# Patient Record
Sex: FEMALE | Race: WHITE
[De-identification: names, ages, dates, MRNs, and addresses within clinical notes are randomized per-mention and may not be internally consistent; named-entity substitution may affect disease eponyms.]

---

## 2018-01-15 ENCOUNTER — HOSPITAL ENCOUNTER (OUTPATIENT)
Dept: HOSPITAL 17 - HROP | Age: 73
Discharge: HOME HEALTH | End: 2018-01-15
Attending: INTERNAL MEDICINE
Payer: MEDICARE

## 2018-01-15 VITALS
HEART RATE: 78 BPM | RESPIRATION RATE: 16 BRPM | OXYGEN SATURATION: 98 % | TEMPERATURE: 97.8 F | DIASTOLIC BLOOD PRESSURE: 57 MMHG | SYSTOLIC BLOOD PRESSURE: 110 MMHG

## 2018-01-15 VITALS
HEART RATE: 67 BPM | OXYGEN SATURATION: 96 % | DIASTOLIC BLOOD PRESSURE: 54 MMHG | SYSTOLIC BLOOD PRESSURE: 104 MMHG | RESPIRATION RATE: 19 BRPM

## 2018-01-15 VITALS
OXYGEN SATURATION: 98 % | RESPIRATION RATE: 18 BRPM | HEART RATE: 77 BPM | SYSTOLIC BLOOD PRESSURE: 107 MMHG | DIASTOLIC BLOOD PRESSURE: 48 MMHG

## 2018-01-15 VITALS — HEIGHT: 62 IN | WEIGHT: 112.22 LBS | BODY MASS INDEX: 20.65 KG/M2

## 2018-01-15 VITALS
OXYGEN SATURATION: 97 % | SYSTOLIC BLOOD PRESSURE: 112 MMHG | DIASTOLIC BLOOD PRESSURE: 72 MMHG | HEART RATE: 72 BPM | RESPIRATION RATE: 16 BRPM

## 2018-01-15 VITALS
SYSTOLIC BLOOD PRESSURE: 103 MMHG | HEART RATE: 80 BPM | RESPIRATION RATE: 17 BRPM | DIASTOLIC BLOOD PRESSURE: 47 MMHG | OXYGEN SATURATION: 97 %

## 2018-01-15 VITALS
DIASTOLIC BLOOD PRESSURE: 71 MMHG | HEART RATE: 89 BPM | SYSTOLIC BLOOD PRESSURE: 127 MMHG | TEMPERATURE: 97.6 F | OXYGEN SATURATION: 99 % | RESPIRATION RATE: 20 BRPM

## 2018-01-15 DIAGNOSIS — C21.0: Primary | ICD-10-CM

## 2018-01-15 DIAGNOSIS — R07.9: ICD-10-CM

## 2018-01-15 DIAGNOSIS — K22.70: ICD-10-CM

## 2018-01-15 DIAGNOSIS — R63.4: ICD-10-CM

## 2018-01-15 DIAGNOSIS — J44.9: ICD-10-CM

## 2018-01-15 DIAGNOSIS — K21.9: ICD-10-CM

## 2018-01-15 DIAGNOSIS — E78.00: ICD-10-CM

## 2018-01-15 DIAGNOSIS — K44.9: ICD-10-CM

## 2018-01-15 DIAGNOSIS — F17.200: ICD-10-CM

## 2018-01-15 LAB
BASOPHILS # BLD AUTO: 0.1 TH/MM3 (ref 0–0.2)
BASOPHILS NFR BLD: 1 % (ref 0–2)
EOSINOPHIL # BLD: 0.3 TH/MM3 (ref 0–0.4)
EOSINOPHIL NFR BLD: 3.3 % (ref 0–4)
ERYTHROCYTE [DISTWIDTH] IN BLOOD BY AUTOMATED COUNT: 17.9 % (ref 11.6–17.2)
HCT VFR BLD CALC: 37.6 % (ref 35–46)
HGB BLD-MCNC: 12.6 GM/DL (ref 11.6–15.3)
INR PPP: 1 RATIO
LYMPHOCYTES # BLD AUTO: 1.1 TH/MM3 (ref 1–4.8)
LYMPHOCYTES NFR BLD AUTO: 15 % (ref 9–44)
MCH RBC QN AUTO: 27.3 PG (ref 27–34)
MCHC RBC AUTO-ENTMCNC: 33.6 % (ref 32–36)
MCV RBC AUTO: 81.3 FL (ref 80–100)
MONOCYTE #: 0.6 TH/MM3 (ref 0–0.9)
MONOCYTES NFR BLD: 8 % (ref 0–8)
NEUTROPHILS # BLD AUTO: 5.6 TH/MM3 (ref 1.8–7.7)
NEUTROPHILS NFR BLD AUTO: 72.7 % (ref 16–70)
PLATELET # BLD: 340 TH/MM3 (ref 150–450)
PMV BLD AUTO: 7.7 FL (ref 7–11)
PROTHROMBIN TIME: 9.9 SEC (ref 9.8–11.6)
RBC # BLD AUTO: 4.62 MIL/MM3 (ref 4–5.3)
WBC # BLD AUTO: 7.6 TH/MM3 (ref 4–11)

## 2018-01-15 PROCEDURE — 36561 INSERT TUNNELED CV CATH: CPT

## 2018-01-15 PROCEDURE — 76937 US GUIDE VASCULAR ACCESS: CPT

## 2018-01-15 PROCEDURE — 77001 FLUOROGUIDE FOR VEIN DEVICE: CPT

## 2018-01-15 PROCEDURE — C1788 PORT, INDWELLING, IMP: HCPCS

## 2018-01-15 PROCEDURE — 85730 THROMBOPLASTIN TIME PARTIAL: CPT

## 2018-01-15 PROCEDURE — 99152 MOD SED SAME PHYS/QHP 5/>YRS: CPT

## 2018-01-15 PROCEDURE — 99153 MOD SED SAME PHYS/QHP EA: CPT

## 2018-01-15 PROCEDURE — 85610 PROTHROMBIN TIME: CPT

## 2018-01-15 PROCEDURE — 85025 COMPLETE CBC W/AUTO DIFF WBC: CPT

## 2018-01-15 NOTE — PD.RAD
Post Procedure Progress Note


Pre Procedure Diagnosis:  


(1) Anal cancer


Post Procedure Diagnosis:  


(1) Anal cancer


Procedure Date:


Lewis 15, 2018


Supervising Radiologist:


Andres Morel


Estimated blood loss:


2cc


Anesthesia:  Local, Conscious Sedation


Plan of Activity


Patient to Unit:  [facility]


Patient Condition:  Good


Additional Comments:


Port placed via the right IJ


Catheter in good position OK for use.


Full dictated report to follow


See PACS Report for procedural detail/treatment











Andres Morel MD Lewis 15, 2018 10:22

## 2018-01-15 NOTE — RADRPT
EXAM DATE/TIME:  01/15/2018 11:23 

 

[facility] COMPARISON:  

No previous studies available for comparison.

                     

 

INDICATIONS :               

Patient with a history of anal cancer.

                     

 

MEDICAL HISTORY :     

COPD

Barrets esophagus

Hypercholestermia

GERD

Hiatal hernia

Anal Cancer

 

SURGICAL HISTORY :     

Hysterectomy

Colonscopy

 

ENCOUNTER:     

Initial

 

ACUITY:     

2 months

 

PAIN SCORE:     

0/10

                     

 

FLUORO TIME:     

0.33 minutes

 

IMAGE SERIES:      

1

 

SEDATION TIME:       

30 minutes

                     

 

ACCESS:     

Right internal jugular vein 

 

SEDATION:      

1.)  4 mg midazolam (Versed)  IV     

2.)  100 mcg fentanyl (Sublimaze)  IV     

      

Prophylactic antibiotics were administered with appropriate pre-procedure timing.     

Vancomycin within 2 hours of procedure, Ancef (or alternative) within 1 hour of procedure.     

      

 

DEVICE:      

1.  8 Montserratian single lumen   [id number]      

 

 

PROCEDURE :     

1.  Continuous pulse oximetry and EKG monitoring.

2.  Intravenous conscious sedation.

3.  Ultrasound guidance for venous access.

4.  Fluoroscopic guided implantable central venous port placement.

 

The patient was placed supine. The neck was prepped in sterile fashion.  Full sterile technique was u
sed, including cap, mask, sterile gloves and gown, and a large sterile sheet.  Hand hygiene and 2% ch
lorhexidine Betadine was utilized per protocol for cutaneous antisepsis with appropriate dry time for
 site.  Sterile gel and sterile probe cover were utilized for ultrasound guidance.   The skin and sub
cutaneous tissues were infiltrated with local anesthetic solution.  

 

Under direct ultrasound guidance, central venous access was accomplished via the right internal jugul
ar vein.  The ultrasound images depicting access guidance were stored and saved to PACS for permanent
 record.  A subcutaneous pocket was created using blunt dissection.  The port was introduced to the p
ocket.  The catheter tubing was fed through a subcutaneous tunnel to the venotomy site.  The catheter
 tubing was cut to a suitable length and then was introduced through a valved Peel-Away sheath and po
sitioned with catheter tubing tip at the cavo-atrial junction level.  The pocket incision was closed 
with subcuticular Vicryl suture.  Steri-Strips were applied.  The port was flushed and locked with he
cem solution per protocol.  Sterile dressing was applied to the site.  The patient tolerated the pr
ocedure well.

 

Conscious sedation was performed with the prescribed dosages and duration as above in the presence of
 an independent trained radiology nurse to assist in the monitoring of the patient.  EKG and oximetry
 remained stable throughout the procedure. The patient tolerated the procedure well and there were no
 complications. The patient was sent to post anesthesia recovery in stable condition.

 

CONCLUSION:     

Uncomplicated ultrasound and fluoroscopic guided implanted central venous port catheter placement as 
described in detail above.  An 8 Montserratian Power port was placed.

 

 

 

 Andres Morel MD on January 15, 2018 at 10:25           

Board Certified Radiologist.

 This report was verified electronically.

## 2018-04-30 ENCOUNTER — HOSPITAL ENCOUNTER (INPATIENT)
Dept: HOSPITAL 17 - NEPE | Age: 73
LOS: 5 days | Discharge: NURSING, REHAB OR LONG-TERM CARE | DRG: 871 | End: 2018-05-05
Attending: FAMILY MEDICINE | Admitting: FAMILY MEDICINE
Payer: COMMERCIAL

## 2018-04-30 VITALS
SYSTOLIC BLOOD PRESSURE: 131 MMHG | RESPIRATION RATE: 21 BRPM | DIASTOLIC BLOOD PRESSURE: 60 MMHG | OXYGEN SATURATION: 95 % | HEART RATE: 94 BPM

## 2018-04-30 VITALS
HEART RATE: 92 BPM | OXYGEN SATURATION: 97 % | RESPIRATION RATE: 25 BRPM | SYSTOLIC BLOOD PRESSURE: 119 MMHG | DIASTOLIC BLOOD PRESSURE: 57 MMHG

## 2018-04-30 VITALS
SYSTOLIC BLOOD PRESSURE: 116 MMHG | DIASTOLIC BLOOD PRESSURE: 96 MMHG | RESPIRATION RATE: 18 BRPM | HEART RATE: 97 BPM | OXYGEN SATURATION: 97 %

## 2018-04-30 VITALS
HEART RATE: 90 BPM | DIASTOLIC BLOOD PRESSURE: 56 MMHG | RESPIRATION RATE: 14 BRPM | OXYGEN SATURATION: 97 % | SYSTOLIC BLOOD PRESSURE: 141 MMHG

## 2018-04-30 VITALS — BODY MASS INDEX: 18.26 KG/M2 | WEIGHT: 99.21 LBS | HEIGHT: 62 IN

## 2018-04-30 VITALS
TEMPERATURE: 97.7 F | RESPIRATION RATE: 20 BRPM | SYSTOLIC BLOOD PRESSURE: 135 MMHG | OXYGEN SATURATION: 97 % | DIASTOLIC BLOOD PRESSURE: 68 MMHG | HEART RATE: 101 BPM

## 2018-04-30 VITALS
HEART RATE: 97 BPM | OXYGEN SATURATION: 98 % | DIASTOLIC BLOOD PRESSURE: 55 MMHG | RESPIRATION RATE: 23 BRPM | SYSTOLIC BLOOD PRESSURE: 113 MMHG

## 2018-04-30 VITALS — OXYGEN SATURATION: 94 %

## 2018-04-30 VITALS
HEART RATE: 94 BPM | RESPIRATION RATE: 18 BRPM | OXYGEN SATURATION: 97 % | TEMPERATURE: 97.6 F | DIASTOLIC BLOOD PRESSURE: 58 MMHG | SYSTOLIC BLOOD PRESSURE: 133 MMHG

## 2018-04-30 DIAGNOSIS — F41.9: ICD-10-CM

## 2018-04-30 DIAGNOSIS — K52.1: ICD-10-CM

## 2018-04-30 DIAGNOSIS — I10: ICD-10-CM

## 2018-04-30 DIAGNOSIS — J69.0: ICD-10-CM

## 2018-04-30 DIAGNOSIS — A41.9: Primary | ICD-10-CM

## 2018-04-30 DIAGNOSIS — E78.00: ICD-10-CM

## 2018-04-30 DIAGNOSIS — K44.9: ICD-10-CM

## 2018-04-30 DIAGNOSIS — J44.9: ICD-10-CM

## 2018-04-30 DIAGNOSIS — Z91.040: ICD-10-CM

## 2018-04-30 DIAGNOSIS — E86.0: ICD-10-CM

## 2018-04-30 DIAGNOSIS — K21.0: ICD-10-CM

## 2018-04-30 DIAGNOSIS — T45.1X5A: ICD-10-CM

## 2018-04-30 DIAGNOSIS — E83.39: ICD-10-CM

## 2018-04-30 DIAGNOSIS — E87.6: ICD-10-CM

## 2018-04-30 DIAGNOSIS — C21.8: ICD-10-CM

## 2018-04-30 DIAGNOSIS — N39.0: ICD-10-CM

## 2018-04-30 DIAGNOSIS — E87.2: ICD-10-CM

## 2018-04-30 DIAGNOSIS — R15.9: ICD-10-CM

## 2018-04-30 DIAGNOSIS — K22.70: ICD-10-CM

## 2018-04-30 DIAGNOSIS — F17.210: ICD-10-CM

## 2018-04-30 DIAGNOSIS — E16.2: ICD-10-CM

## 2018-04-30 DIAGNOSIS — K59.00: ICD-10-CM

## 2018-04-30 LAB
ALBUMIN SERPL-MCNC: 2.5 GM/DL (ref 3.4–5)
ALP SERPL-CCNC: 225 U/L (ref 45–117)
ALT SERPL-CCNC: 18 U/L (ref 10–53)
AST SERPL-CCNC: 25 U/L (ref 15–37)
BACTERIA #/AREA URNS HPF: (no result) /HPF
BASOPHILS # BLD AUTO: 0 TH/MM3 (ref 0–0.2)
BASOPHILS NFR BLD: 0.1 % (ref 0–2)
BILIRUB SERPL-MCNC: 0.8 MG/DL (ref 0.2–1)
BUN SERPL-MCNC: 11 MG/DL (ref 7–18)
CALCIUM SERPL-MCNC: 9 MG/DL (ref 8.5–10.1)
CHLORIDE SERPL-SCNC: 99 MEQ/L (ref 98–107)
COLOR UR: YELLOW
CREAT SERPL-MCNC: 0.65 MG/DL (ref 0.5–1)
EOSINOPHIL # BLD: 0 TH/MM3 (ref 0–0.4)
EOSINOPHIL NFR BLD: 0.2 % (ref 0–4)
ERYTHROCYTE [DISTWIDTH] IN BLOOD BY AUTOMATED COUNT: 33.4 % (ref 11.6–17.2)
GFR SERPLBLD BASED ON 1.73 SQ M-ARVRAT: 90 ML/MIN (ref 89–?)
GLUCOSE SERPL-MCNC: 79 MG/DL (ref 74–106)
GLUCOSE UR STRIP-MCNC: (no result) MG/DL
HCO3 BLD-SCNC: 26.3 MEQ/L (ref 21–32)
HCT VFR BLD CALC: 30 % (ref 35–46)
HGB BLD-MCNC: 10.1 GM/DL (ref 11.6–15.3)
HGB UR QL STRIP: (no result)
INR PPP: 0.9 RATIO
KETONES UR STRIP-MCNC: 40 MG/DL
LYMPHOCYTES # BLD AUTO: 0.3 TH/MM3 (ref 1–4.8)
LYMPHOCYTES NFR BLD AUTO: 5.9 % (ref 9–44)
LYMPHOCYTES: 5 % (ref 9–44)
MCH RBC QN AUTO: 30.4 PG (ref 27–34)
MCHC RBC AUTO-ENTMCNC: 33.5 % (ref 32–36)
MCV RBC AUTO: 90.5 FL (ref 80–100)
METAMYELOCYTES NFR BLD: 1 % (ref 0–1)
MONOCYTE #: 0.5 TH/MM3 (ref 0–0.9)
MONOCYTES NFR BLD: 9 % (ref 0–8)
MONOCYTES: 5 % (ref 0–8)
MUCOUS THREADS #/AREA URNS LPF: (no result) /LPF
MYELOCYTES NFR BLD: 2 % (ref 0–0)
NEUTROPHILS # BLD AUTO: 4.5 TH/MM3 (ref 1.8–7.7)
NEUTROPHILS NFR BLD AUTO: 84.8 % (ref 16–70)
NEUTS BAND # BLD MANUAL: 4.8 TH/MM3 (ref 1.8–7.7)
NEUTS BAND NFR BLD: 6 % (ref 0–6)
NEUTS SEG NFR BLD MANUAL: 81 % (ref 16–70)
NITRITE UR QL STRIP: (no result)
NUCLEATED RED BLOOD CELL: 1 (ref 0–0)
PLATELET # BLD: 272 TH/MM3 (ref 150–450)
PMV BLD AUTO: 7.8 FL (ref 7–11)
PROT SERPL-MCNC: 7.2 GM/DL (ref 6.4–8.2)
PROTHROMBIN TIME: 9.5 SEC (ref 9.8–11.6)
RBC # BLD AUTO: 3.32 MIL/MM3 (ref 4–5.3)
SODIUM SERPL-SCNC: 138 MEQ/L (ref 136–145)
SP GR UR STRIP: 1.03 (ref 1–1.03)
SQUAMOUS #/AREA URNS HPF: 3 /HPF (ref 0–5)
URINE LEUKOCYTE ESTERASE: (no result)
WBC # BLD AUTO: 5.3 TH/MM3 (ref 4–11)
WBC NRBC COR # BLD: 1 /100 WBC (ref 0–0)

## 2018-04-30 PROCEDURE — 36600 WITHDRAWAL OF ARTERIAL BLOOD: CPT

## 2018-04-30 PROCEDURE — 83690 ASSAY OF LIPASE: CPT

## 2018-04-30 PROCEDURE — 87086 URINE CULTURE/COLONY COUNT: CPT

## 2018-04-30 PROCEDURE — 87040 BLOOD CULTURE FOR BACTERIA: CPT

## 2018-04-30 PROCEDURE — 80048 BASIC METABOLIC PNL TOTAL CA: CPT

## 2018-04-30 PROCEDURE — 85730 THROMBOPLASTIN TIME PARTIAL: CPT

## 2018-04-30 PROCEDURE — 82947 ASSAY GLUCOSE BLOOD QUANT: CPT

## 2018-04-30 PROCEDURE — 87493 C DIFF AMPLIFIED PROBE: CPT

## 2018-04-30 PROCEDURE — 94640 AIRWAY INHALATION TREATMENT: CPT

## 2018-04-30 PROCEDURE — 96365 THER/PROPH/DIAG IV INF INIT: CPT

## 2018-04-30 PROCEDURE — 85025 COMPLETE CBC W/AUTO DIFF WBC: CPT

## 2018-04-30 PROCEDURE — 83735 ASSAY OF MAGNESIUM: CPT

## 2018-04-30 PROCEDURE — 82805 BLOOD GASES W/O2 SATURATION: CPT

## 2018-04-30 PROCEDURE — 71045 X-RAY EXAM CHEST 1 VIEW: CPT

## 2018-04-30 PROCEDURE — 82948 REAGENT STRIP/BLOOD GLUCOSE: CPT

## 2018-04-30 PROCEDURE — 80069 RENAL FUNCTION PANEL: CPT

## 2018-04-30 PROCEDURE — 85027 COMPLETE CBC AUTOMATED: CPT

## 2018-04-30 PROCEDURE — 82533 TOTAL CORTISOL: CPT

## 2018-04-30 PROCEDURE — 74230 X-RAY XM SWLNG FUNCJ C+: CPT

## 2018-04-30 PROCEDURE — 94664 DEMO&/EVAL PT USE INHALER: CPT

## 2018-04-30 PROCEDURE — 85018 HEMOGLOBIN: CPT

## 2018-04-30 PROCEDURE — 74177 CT ABD & PELVIS W/CONTRAST: CPT

## 2018-04-30 PROCEDURE — 96361 HYDRATE IV INFUSION ADD-ON: CPT

## 2018-04-30 PROCEDURE — 80053 COMPREHEN METABOLIC PANEL: CPT

## 2018-04-30 PROCEDURE — 87506 IADNA-DNA/RNA PROBE TQ 6-11: CPT

## 2018-04-30 PROCEDURE — 81001 URINALYSIS AUTO W/SCOPE: CPT

## 2018-04-30 PROCEDURE — 85610 PROTHROMBIN TIME: CPT

## 2018-04-30 PROCEDURE — 83605 ASSAY OF LACTIC ACID: CPT

## 2018-04-30 PROCEDURE — 85007 BL SMEAR W/DIFF WBC COUNT: CPT

## 2018-04-30 RX ADMIN — SODIUM CHLORIDE SCH MLS/HR: 900 INJECTION, SOLUTION INTRAVENOUS at 22:09

## 2018-04-30 RX ADMIN — ENOXAPARIN SODIUM SCH MG: 40 INJECTION SUBCUTANEOUS at 20:29

## 2018-04-30 RX ADMIN — BUDESONIDE AND FORMOTEROL FUMARATE DIHYDRATE SCH PUFF: 160; 4.5 AEROSOL RESPIRATORY (INHALATION) at 20:28

## 2018-04-30 RX ADMIN — IPRATROPIUM BROMIDE AND ALBUTEROL SULFATE SCH AMPULE: .5; 3 SOLUTION RESPIRATORY (INHALATION) at 22:21

## 2018-04-30 RX ADMIN — MONTELUKAST SODIUM SCH MG: 10 TABLET, COATED ORAL at 20:28

## 2018-04-30 RX ADMIN — Medication SCH ML: at 20:29

## 2018-04-30 RX ADMIN — PHENYTOIN SODIUM SCH MLS/HR: 50 INJECTION INTRAMUSCULAR; INTRAVENOUS at 20:27

## 2018-04-30 NOTE — RADRPT
EXAM DATE/TIME:  04/30/2018 17:42 

 

[facility] COMPARISON:     

No previous studies available for comparison.

 

                     

INDICATIONS :     

Cough

                     

 

MEDICAL HISTORY :            

Cardiovascular disease. Chronic obstructive pulmonary disease. Carcinoma, rectal.Hypertension   

 

SURGICAL HISTORY :        

Appendectomy. Cholecystectomy, infusaport

 

ENCOUNTER:     

Initial                                        

 

ACUITY:     

2 weeks      

 

PAIN SCORE:     

0/10

 

LOCATION:      

chest 

 

FINDINGS:     

Mild patient rotation towards the right.  The left lung is clear.  The heart is normal in size.  Righ
t central line catheter tip projects at the cavoatrial junction.  There are multiple cardiac leads pr
ojected over the right lower chest; behind these leads, there is a lucency which could represent a pl
eural reflection near the lateral costophrenic angle.  There are also two linear lucencies projected 
over the upper right chest which could represent skin folds or represent possible pneumothorax.

 

CONCLUSION:     

Equivocal findings in the right chest suggesting possibility of pneumothorax.  Recommend repeat film 
in expiration and with moving the multiple cardiac leads which project over the lower right chest.

 

 

 

 Rayo Dominguez MD on April 30, 2018 at 18:03           

Board Certified Radiologist.

 This report was verified electronically.

## 2018-04-30 NOTE — PD
HPI


Chief Complaint:  GI Complaint


Time Seen by Provider:  15:22


Travel History


International Travel<30 days:  No


Contact w/Intl Traveler<30days:  No


Traveled to known affect area:  No





History of Present Illness


HPI


72-year-old female that presents to the ED for evaluation of constipation for 

about a week, dehydration as well as not taking care of herself.  Patient has a 

significant history of rectal cancer.  Patient lives alone and apparently has 

not been taking care of herself.  Denies any urinary issues.  Patient does 

state that she is been having no bowel movements for the past week.  Per 

patient she is at chemoradiation and has stopped this is the beginning of the 

month.  Per patient she does not know what the next step is and she follows 

closely with her oncologist and her radiologist oncologist.  She has not 

mentioned this to them.  Patient denies any bleeding but states that she has 

had some "discharge" from the rectum and uses depends.  She cannot really tell 

me what it looks like.  No fevers chills or sweats.  No falls.  She states 

having some lower abdominal pain.  She does have also history of COPD and uses 

inhalers at home.  Patient's pain is 7 out of 10 on the lower abdomen.  Per 

friend who is at bedside patient unfortunately has no family lives alone and 

she is highly concerned about the patient being by herself and not been able to 

take care of self.  She apparently is neglecting herself and is not taking care 

of herself and she wanted us to see if we could have case management involved 

for possible placement or home health.





PFSH


Past Medical History


Asthma:  No


Blood Disorders:  No


Anxiety:  Yes


Depression:  No


Heart Rhythm Problems:  No


Cancer:  Yes (RECTAL/ANAL)


Cardiac Catheterization:  No


Cardiovascular Problems:  Yes (CATH 1996)


High Cholesterol:  Yes


Chest Pain:  Yes


Congestive Heart Failure:  No


COPD:  Yes


Diabetes:  No


Diminished Hearing:  No


Endocrine:  No


Gastrointestinal Disorders:  Yes (GERD, DAWSON'S ESOPHAGUS)


GERD:  Yes


Genitourinary:  No


Hypertension:  Yes


Immune Disorder:  No


Implanted Vascular Access Dvce:  Yes (RIGHT CHEST)


Musculoskeletal:  Yes (USES CANE FOR BALANCE)


Neurologic:  No


Psychiatric:  No


Reproductive:  No


Respiratory:  Yes (CHRONIC BRONCHITIS, EMPHASEMA)


Immunizations Current:  Yes


Myocardial Infarction:  No


Sleep Apnea:  No


PNEUMOCCOCAL Vaccine (Year):  1


Menopausal:  Yes





Past Surgical History


Abdominal Surgery:  Yes (2 BOWEL RESECTIONS DUE TO MASSES)


Appendectomy:  Yes


Cholecystectomy:  Yes


Coronary Artery Bypass Graft:  No


Hysterectomy:  Yes


Other Surgery:  Yes (PORT PLACED- RIGHT CHEST)





Social History


Alcohol Use:  No


Tobacco Use:  Yes (5 CIGARETTES PER DAY)


Substance Use:  No





Allergies-Medications


(Allergen,Severity, Reaction):  


Coded Allergies:  


     latex (Verified  Allergy, Severe, Rash, 4/30/18)


Reported Meds & Prescriptions





Reported Meds & Active Scripts


Active


Reported


Symbicort Inh (Budesonide/Formoterol Fumarate) 160-4.5 Mcg/Act Aero 2 Puff INH 

Q12HR


Ventolin Hfa 18 GM Inh (Albuterol Sulfate) 90 Mcg/Act Aer 2 Puff INH Q6H PRN


Duoneb (Ipratropium-Albuterol Neb) 0.5-2.5 Mg/3 Ml Neb 1 Nebule INH Q6HR NEB


Dicyclomine (Dicyclomine HCl) 20 Mg Tab 20 Mg PO QID


Amitiza (Lubiprostone) 8 Mcg Cap 24 Mcg PO BID


Sucralfate 1 Gram Tab 1 Gm PO TID


     on empty stomach


Lorazepam 0.5 Mg Tab 0.5 Mg PO BID PRN


Zantac (Ranitidine HCl) 300 Mg Tab 300 Mg PO DAILY


Pantoprazole (Pantoprazole Sodium) 40 Mg Tab 40 Mg PO DAILY


Montelukast (Montelukast Sodium) 10 Mg Tab 10 Mg PO HS


Simvastatin 20 Mg Tab 20 Mg PO DAILY


Proair Hfa (Albuterol Sulfate) 90 Mcg Hfa.aer.ad 2.5 Mg INH Q6HR








Review of Systems


Except as stated in HPI:  all other systems reviewed are Neg





Physical Exam


Narrative


GENERAL: 


SKIN: Warm and dry.


HEAD: Atraumatic. Normocephalic. 


EYES: Pupils equal and round. No scleral icterus. No injection or drainage. 


ENT: No nasal bleeding or discharge.  Mucous membranes pink and moist.  Tongue 

is midline.  No uvula deviation.


NECK: Trachea midline. No JVD. 


CARDIOVASCULAR: Regular rate and rhythm.  No murmurs, S3, S4.


RESPIRATORY: No accessory muscle use. Clear to auscultation. Breath sounds 

equal bilaterally. 


GASTROINTESTINAL: Abdomen soft, patient has a producible pain on the left lower 

quadrant, nondistended. Hepatic and splenic margins not palpable.  Rectal exam 

revealed what appears to be a mass at the 6:00 area of the rectum.  Patient 

does appear to have some liquidy stool what appears to be diarrhea on the 

dependent as well as on exam.  No obvious hard stool noted.  No obvious sign of 

infection or deformity.  Patient does appear to have some old hemorrhoids as 

well.


MUSCULOSKELETAL: Extremities without clubbing, cyanosis, or edema. No obvious 

deformities.  Full range of motion of the upper and lower extremities 

bilaterally.  2+ pulses bilaterally.


NEUROLOGICAL: Awake and alert. No obvious cranial nerve deficits.  Motor 

grossly within normal limits. Five out of 5 muscle strength in the arms and 

legs.  Normal speech.


PSYCHIATRIC: Appropriate mood and affect; insight and judgment normal.





Data


Data


Last Documented VS





Vital Signs








  Date Time  Temp Pulse Resp B/P (MAP) Pulse Ox O2 Delivery O2 Flow Rate FiO2


 


4/30/18 19:14  97 18 116/96 (103) 97 Room Air  


 


4/30/18 12:58 97.7       








Orders





 Orders


Complete Blood Count With Diff (4/30/18 13:01)


Comprehensive Metabolic Panel (4/30/18 13:01)


Lipase (4/30/18 13:01)


Prothrombin Time / Inr (Pt) (4/30/18 13:01)


Act Partial Throm Time (Ptt) (4/30/18 13:01)


Urinalysis - C+S If Indicated (4/30/18 13:01)


Ct Abd/Pel W Iv Contrast(Rout) (4/30/18 15:44)


Fleets Enema (Adult) (Fleets Enema (Adul (4/30/18 15:45)


Urine Culture (4/30/18 14:46)


Sodium Chlorid 0.9% 500 Ml Inj (Ns 500 M (4/30/18 16:00)


Iohexol 350 Inj (Omnipaque 350 Inj) (4/30/18 16:56)


Chest, Single Ap (4/30/18 )


Ceftriaxone Inj (Rocephin Inj) (4/30/18 17:45)


Azithromycin Inj (Zithromax Inj) (4/30/18 17:45)


Blood Culture (4/30/18 18:06)


Chest, Single Ap (4/30/18 )


Metronidazole 500 Mg Inj (Flagyl 500 Mg (4/30/18 19:10)


Admit Order (Ed Use Only) (4/30/18 19:18)





Labs





Laboratory Tests








Test


  4/30/18


14:46 4/30/18


15:03


 


Urine Color YELLOW  


 


Urine Turbidity HAZY  


 


Urine pH 6.0  


 


Urine Specific Gravity 1.028  


 


Urine Protein 30 mg/dL  


 


Urine Glucose (UA) NEG mg/dL  


 


Urine Ketones 40 mg/dL  


 


Urine Occult Blood NEG  


 


Urine Nitrite NEG  


 


Urine Bilirubin NEG  


 


Urine Urobilinogen 8.0 MG/DL  


 


Urine Leukocyte Esterase TRACE  


 


Urine RBC 1 /hpf  


 


Urine WBC 9 /hpf  


 


Urine Squamous Epithelial


Cells 3 /hpf 


  


 


 


Urine Bacteria OCC /hpf  


 


Urine Mucus MANY /lpf  


 


Microscopic Urinalysis Comment


  CULTURE


INDICATED 


 


 


White Blood Count  5.3 TH/MM3 


 


Red Blood Count  3.32 MIL/MM3 


 


Hemoglobin  10.1 GM/DL 


 


Hematocrit  30.0 % 


 


Mean Corpuscular Volume  90.5 FL 


 


Mean Corpuscular Hemoglobin  30.4 PG 


 


Mean Corpuscular Hemoglobin


Concent 


  33.5 % 


 


 


Red Cell Distribution Width  33.4 % 


 


Platelet Count  272 TH/MM3 


 


Mean Platelet Volume  7.8 FL 


 


Neutrophils (%) (Auto)  84.8 % 


 


Lymphocytes (%) (Auto)  5.9 % 


 


Monocytes (%) (Auto)  9.0 % 


 


Eosinophils (%) (Auto)  0.2 % 


 


Basophils (%) (Auto)  0.1 % 


 


Neutrophils # (Auto)  4.5 TH/MM3 


 


Lymphocytes # (Auto)  0.3 TH/MM3 


 


Monocytes # (Auto)  0.5 TH/MM3 


 


Eosinophils # (Auto)  0.0 TH/MM3 


 


Basophils # (Auto)  0.0 TH/MM3 


 


CBC Comment  AUTO DIFF 


 


Differential Total Cells


Counted 


  100 


 


 


Neutrophils % (Manual)  81 % 


 


Band Neutrophils %  6 % 


 


Lymphocytes %  5 % 


 


Monocytes %  5 % 


 


Neutrophils # (Manual)  4.8 TH/MM3 


 


Metamyelocytes  1 % 


 


Myelocytes  2 % 


 


Nucleated Red Blood Cells  1 /100 WBC 


 


Differential Comment


  


  FINAL DIFF


MANUAL


 


Platelet Estimate  NORMAL 


 


Platelet Morphology Comment  NORMAL 


 


Prothrombin Time  9.5 SEC 


 


Prothromb Time International


Ratio 


  0.9 RATIO 


 


 


Activated Partial


Thromboplast Time 


  23.6 SEC 


 


 


Blood Urea Nitrogen  11 MG/DL 


 


Creatinine  0.65 MG/DL 


 


Random Glucose  79 MG/DL 


 


Total Protein  7.2 GM/DL 


 


Albumin  2.5 GM/DL 


 


Calcium Level  9.0 MG/DL 


 


Alkaline Phosphatase  225 U/L 


 


Aspartate Amino Transf


(AST/SGOT) 


  25 U/L 


 


 


Alanine Aminotransferase


(ALT/SGPT) 


  18 U/L 


 


 


Total Bilirubin  0.8 MG/DL 


 


Sodium Level  138 MEQ/L 


 


Potassium Level  3.1 MEQ/L 


 


Chloride Level  99 MEQ/L 


 


Carbon Dioxide Level  26.3 MEQ/L 


 


Anion Gap  13 MEQ/L 


 


Estimat Glomerular Filtration


Rate 


  90 ML/MIN 


 


 


Lipase  35 U/L 











MDM


Medical Decision Making


Medical Screen Exam Complete:  Yes


Emergency Medical Condition:  Yes


Medical Record Reviewed:  Yes


Interpretation(s)


CBC & BMP Diagram


4/30/18 15:03








Total Protein 7.2, Albumin 2.5 L, Calcium Level 9.0, Alkaline Phosphatase 225 H

, Aspartate Amino Transf (AST/SGOT) 25, Alanine Aminotransferase (ALT/SGPT) 18, 

Total Bilirubin 0.8








Last Impressions








Abdomen/Pelvis CT 4/30/18 1544 Signed





Impressions: 





 Service Date/Time:  Monday, April 30, 2018 16:40 - CONCLUSION:  1. Perirectal 





 posttreatment change without evidence for abscess or perforation. 2. Right 

lung 





 base and inferior right middle lobe interstitial and air space consolidation 





 concerning for pneumonia or aspiration. This is a new finding since 1/24/2018 





 exam. 3. Additional ancillary findings, as above.     Alvaro Gage MD 


 


Chest X-Ray 4/30/18 0000 Signed





Impressions: 





 Service Date/Time:  Monday, April 30, 2018 18:19 - CONCLUSION:  1. Right-sided 





 basilar airspace disease. Differential diagnosis includes pneumonia and 





 aspiration.     Tom Hinson MD 


 


Chest X-Ray 4/30/18 0000 Signed





Impressions: 





 Service Date/Time:  Monday, April 30, 2018 17:42 - CONCLUSION:  Equivocal 





 findings in the right chest suggesting possibility of pneumothorax.  Recommend 





 repeat film in expiration and with moving the multiple cardiac leads which 





 project over the lower right chest.     Rayo Dominguez MD 





UA shows possible UTI


Differential Diagnosis


Constipation versus rectal cancer versus anal cancer versus abdominal pain 

versus diverticulitis versus sepsis versus dehydration versus inability to take 

care of self versus weakness


Narrative Course


72-year-old female that presents to the ED for evaluation of constipation and 

inability to take care of self.  Patient was properly examined and was found to 

have signs and symptoms consistent with appears to be possible obstruction.  

Patient has a significant illness including rectal cancer.  Labs and imaging 

order.  Patient was given IV fluids here.  Labs and imaging showed what appears 

to be pneumonia versus aspiration pneumonia.  Otherwise unremarkable exam.  

Patient does have rectal cancer.  I believe that the patient is likely more 

having leakage from stool.  Does not appear to have an abscess on exam.  The 

recommend admission for further evaluation of the pneumonia.  Patient will 

likely need placement as well.  Case discussed with my attending Dr. Boyce 

who agrees to admission.  Patient was started on antibiotics IV.  Patient 

agrees with admission.  Case discussed with Dr. Vila who agrees to admission.





Diagnosis





 Primary Impression:  


 Pneumonia


 Qualified Codes:  J69.0 - Pneumonitis due to inhalation of food and vomit


 Additional Impressions:  


 Anal cancer


 Constipation, chronic





Admitting Information


Admitting Physician Requests:  Admit











Graeme Lawrence Apr 30, 2018 16:49

## 2018-04-30 NOTE — HHI.HP
__________________________________________________





HPI


Service


[facility]


Primary Care Physician


Eloina Sanchez MD


Admission Diagnosis





acute pneumonia vs aspiration pneumonia, UTI, weakness


Diagnoses:  


Travel History


International Travel<30 Days:  No


Contact w/Intl Traveler <30 Da:  No


Traveled to Known Affected Are:  No


History of Present Illness


72 year old female with a PMH significant for rectal ca, GERD/Kerr's 

esophagus and COPD presents to the ED for the evaluation of constipation.  The 

patient reports she has not had a solid bowel movement for the past week.  She 

endorses a constant anal leakage of fecal material.  Her friend who is 

accompanying her reports that the patient has had increasing weakness and 

anorexia for the past week.  She completed radiation on 18 and her last 

chemotherapy was 3/26/18.  Her oncologist is Dr. England.  The patient endorses a 

subjective fever with a productive cough.  She denies any shortness of breath 

or chest pain.  No nausea/vomiting/diarrhea.  No abdominal pain.  No 

lateralizing signs/symptoms.





Review of Systems


Except as stated in HPI:  all other systems reviewed are Neg





Past Family Social History


Past Medical History


Rectal cancer


Kerr's esophagus


GERD


COPD


Past Surgical History


Small bowel resection for unspecified mass


Large bowel resection for unspecified mass


Cholecystectomy


Appendectomy


Hysterectomy


Reported Medications





Reported Meds & Active Scripts


Active


Reported


Symbicort Inh (Budesonide/Formoterol Fumarate) 160-4.5 Mcg/Act Aero 2 Puff INH 

Q12HR


Ventolin Hfa 18 GM Inh (Albuterol Sulfate) 90 Mcg/Act Aer 2 Puff INH Q6H PRN


Duoneb (Ipratropium-Albuterol Neb) 0.5-2.5 Mg/3 Ml Neb 1 Nebule INH Q6HR NEB


Dicyclomine (Dicyclomine HCl) 20 Mg Tab 20 Mg PO QID


Amitiza (Lubiprostone) 8 Mcg Cap 24 Mcg PO BID


Sucralfate 1 Gram Tab 1 Gm PO TID


     on empty stomach


Lorazepam 0.5 Mg Tab 0.5 Mg PO BID PRN


Zantac (Ranitidine HCl) 300 Mg Tab 300 Mg PO DAILY


Pantoprazole (Pantoprazole Sodium) 40 Mg Tab 40 Mg PO DAILY


Montelukast (Montelukast Sodium) 10 Mg Tab 10 Mg PO HS


Simvastatin 20 Mg Tab 20 Mg PO DAILY


Proair Hfa (Albuterol Sulfate) 90 Mcg Hfa.aer.ad 2.5 Mg INH Q6HR


Allergies:  


Coded Allergies:  


     latex (Verified  Allergy, Severe, Rash, 18)


Family History


Negative for CAD/DM


Social History


Smokes approximately 5 cigarettes per day.  Denies alcohol, illicit drugs.





Physical Exam


Vital Signs





Vital Signs








  Date Time  Temp Pulse Resp B/P (MAP) Pulse Ox O2 Delivery O2 Flow Rate FiO2


 


18 20:00        


 


18 19:14  97 18 116/96 (103) 97 Room Air  


 


18 18:00  94 21 131/60 (83) 95 Room Air  


 


18 17:41  97 23 113/55 (74) 98 Room Air  


 


18 16:00  92 25 119/57 (77) 97 Room Air  


 


18 15:46  90 14 141/56 (84) 97 Room Air  


 


18 12:58 97.7 101 20 135/68 (90) 97   








Physical Exam


GENERAL: Thin,  female lying in bed


SKIN: No rashes, ecchymoses or lesions. Cool and dry.


HEAD: Atraumatic. Normocephalic. No temporal or scalp tenderness.


EYES: Pupils equal round and reactive. Extraocular motions intact. No scleral 

icterus. No injection or drainage. 


ENT: Nose without bleeding, purulent drainage or septal hematoma. Throat 

without erythema, tonsillar hypertrophy or exudate. Uvula midline. Airway 

patent.


NECK: Trachea midline. No JVD or lymphadenopathy. Supple, nontender, no 

meningeal signs.


CARDIOVASCULAR: Regular rate and rhythm without murmurs, gallops, or rubs. 


RESPIRATORY: Crackles in right lung base.


GASTROINTESTINAL: Abdomen soft, non-tender, nondistended. No hepato-splenomegaly

, or palpable masses. No guarding.


MUSCULOSKELETAL: Extremities without clubbing, cyanosis, or edema. No joint 

tenderness, effusion, or edema noted. No calf tenderness. 


NEUROLOGICAL: Awake and alert. Cranial nerves II through XII intact.  Motor and 

sensory grossly within normal limits. Normal speech.


Laboratory





Laboratory Tests








Test


  18


14:46 18


15:03


 


Urine Color YELLOW  


 


Urine Turbidity HAZY  


 


Urine pH 6.0  


 


Urine Specific Gravity 1.028  


 


Urine Protein 30  


 


Urine Glucose (UA) NEG  


 


Urine Ketones 40  


 


Urine Occult Blood NEG  


 


Urine Nitrite NEG  


 


Urine Bilirubin NEG  


 


Urine Urobilinogen 8.0  


 


Urine Leukocyte Esterase TRACE  


 


Urine RBC 1  


 


Urine WBC 9  


 


Urine Squamous Epithelial


Cells 3 


  


 


 


Urine Bacteria OCC  


 


Urine Mucus MANY  


 


Microscopic Urinalysis Comment


  CULTURE


INDICATED 


 


 


White Blood Count  5.3 


 


Red Blood Count  3.32 


 


Hemoglobin  10.1 


 


Hematocrit  30.0 


 


Mean Corpuscular Volume  90.5 


 


Mean Corpuscular Hemoglobin  30.4 


 


Mean Corpuscular Hemoglobin


Concent 


  33.5 


 


 


Red Cell Distribution Width  33.4 


 


Platelet Count  272 


 


Mean Platelet Volume  7.8 


 


Neutrophils (%) (Auto)  84.8 


 


Lymphocytes (%) (Auto)  5.9 


 


Monocytes (%) (Auto)  9.0 


 


Eosinophils (%) (Auto)  0.2 


 


Basophils (%) (Auto)  0.1 


 


Neutrophils # (Auto)  4.5 


 


Lymphocytes # (Auto)  0.3 


 


Monocytes # (Auto)  0.5 


 


Eosinophils # (Auto)  0.0 


 


Basophils # (Auto)  0.0 


 


CBC Comment  AUTO DIFF 


 


Differential Total Cells


Counted 


  100 


 


 


Neutrophils % (Manual)  81 


 


Band Neutrophils %  6 


 


Lymphocytes %  5 


 


Monocytes %  5 


 


Neutrophils # (Manual)  4.8 


 


Metamyelocytes  1 


 


Myelocytes  2 


 


Nucleated Red Blood Cells  1 


 


Differential Comment


  


  FINAL DIFF


MANUAL


 


Platelet Estimate  NORMAL 


 


Platelet Morphology Comment  NORMAL 


 


Prothrombin Time  9.5 


 


Prothromb Time International


Ratio 


  0.9 


 


 


Activated Partial


Thromboplast Time 


  23.6 


 


 


Blood Urea Nitrogen  11 


 


Creatinine  0.65 


 


Random Glucose  79 


 


Total Protein  7.2 


 


Albumin  2.5 


 


Calcium Level  9.0 


 


Alkaline Phosphatase  225 


 


Aspartate Amino Transf


(AST/SGOT) 


  25 


 


 


Alanine Aminotransferase


(ALT/SGPT) 


  18 


 


 


Total Bilirubin  0.8 


 


Sodium Level  138 


 


Potassium Level  3.1 


 


Chloride Level  99 


 


Carbon Dioxide Level  26.3 


 


Anion Gap  13 


 


Estimat Glomerular Filtration


Rate 


  90 


 


 


Lipase  35 














 Date/Time


Source Procedure


Growth Status


 


 


 18 18:30


Blood Peripheral Aerobic Blood Culture


Pending Received


 


 18 18:30


Blood Peripheral Anaerobic Blood Culture


Pending Received


 


 18 14:46


Urine Clean Catch Urine Culture


Pending Received








Result Diagram:  


18 1503                                                                   

             18 1503








Caprini VTE Risk Assessment


Caprini VTE Risk Assessment:  Mod/High Risk (score >= 2)


Caprini Risk Assessment Model











 Point Value = 1          Point Value = 2  Point Value = 3  Point Value = 5


 


Age 41-60


Minor surgery


BMI > 25 kg/m2


Swollen legs


Varicose veins


Pregnancy or postpartum


History of unexplained or recurrent


   spontaneous 


Oral contraceptives or hormone


   replacement


Sepsis (< 1 month)


Serious lung disease, including


   pneumonia (< 1 month)


Abnormal pulmonary function


Acute myocardial infarction


Congestive heart failure (< 1 month)


History of inflammatory bowel disease


Medical patient at bed rest Age 61-74


Arthroscopic surgery


Major open surgery (> 45 min)


Laparoscopic surgery (> 45 min)


Malignancy


Confined to bed (> 72 hours)


Immobilizing plaster cast


Central venous access Age >= 75


History of VTE


Family history of VTE


Factor V Leiden


Prothrombin 10840A


Lupus anticoagulant


Anticardiolipin antibodies


Elevated serum homocysteine


Heparin-induced thrombocytopenia


Other congenital or acquired


   thrombophilia Stroke (< 1 month)


Elective arthroplasty


Hip, pelvis, or leg fracture


Acute spinal cord injury (< 1 month)








Prophylaxis Regimen











   Total Risk


Factor Score Risk Level Prophylaxis Regimen


 


0-1      Low Early ambulation


 


2 Moderate Order ONE of the following:


*Sequential Compression Device (SCD)


*Heparin 5000 units SQ BID


 


3-4 Higher Order ONE of the following medications:


*Heparin 5000 units SQ TID


*Enoxaparin/Lovenox 40 mg SQ daily (WT < 150 kg, CrCl > 30 mL/min)


*Enoxaparin/Lovenox 30 mg SQ daily (WT < 150 kg, CrCl > 10-29 mL/min)


*Enoxaparin/Lovenox 30 mg SQ BID (WT < 150 kg, CrCl > 30 mL/min)


AND/OR


*Sequential Compression Device (SCD)


 


5 or more Highest Order ONE of the following medications:


*Heparin 5000 units SQ TID (Preferred with Epidurals)


*Enoxaparin/Lovenox 40 mg SQ daily (WT < 150 kg, CrCl > 30 mL/min)


*Enoxaparin/Lovenox 30 mg SQ daily (WT < 150 kg, CrCl > 10-29 mL/min)


*Enoxaparin/Lovenox 30 mg SQ BID (WT < 150 kg, CrCl > 30 mL/min)


AND


*Sequential Compression Device (SCD)











Assessment and Plan


Assessment and Plan


Assessment/plan:





1.  Pneumonia


Aspiration versus community-acquired


Chest x-ray significant for right-sided pneumonia versus aspiration, personally 

reviewed


Azithromycin, Rocephin, Flagyl


Swallow eval pending





2.  UTI


UA consistent with urinary tract infection


Urine culture pending


Antibiotics as above





3.  COPD


Duo nebs


Continue home Symbicort


Antibiotics as above





4.  GERD/Kerr's esophagus


Continue PPI





5.  History of rectal cancer


Status post chemotherapy and radiation


Follow-up as outpatient with oncologist





6.  Fecal incontinence


Suspect secondary to postradiation changes


CT of the abdomen/pelvis showed no evidence of abscess or perforation without 

stool buildup, personally reviewed





Patient lives at home alone, believes she is no longer safe to care for herself 

without assistance.  Case management consulted to assist with placement.





FEN


Regular diet


Electrolytes: s/p PO potassium, repeat BMP


Lovenox





Physician Certification


2 Midnight Certification Type:  Admission for Inpatient Services


Order for Inpatient Services


The services are ordered in accordance with Medicare regulations or non-

Medicare payer requirements, as applicable.  In the case of services not 

specified as inpatient-only, they are appropriately provided as inpatient 

services in accordance with the 2-midnight benchmark.


Estimated LOS (days):  2


2 days is the estimated time the patient will need to remain in the hospital, 

assuming treatment plan goals are met and no additional complications.


Post-Hospital Plan:  Not yet determined











Hayley Vila MD 2018 20:39

## 2018-04-30 NOTE — RADRPT
EXAM DATE/TIME:  04/30/2018 16:40 

 

[facility] COMPARISON:     

CT SIMULATION, January 24, 2018, 10:15.

 

 

INDICATIONS :     

Recently finished chemo and radiation for rectal cancer,now having rectal drainage and pain.

                      

 

IV CONTRAST:     

75 cc Omnipaque 350 (iohexol) IV 

 

 

ORAL CONTRAST:      

No oral contrast ingested.

                      

 

RADIATION DOSE:     

6.64 CTDIvol (mGy) 

 

 

MEDICAL HISTORY :     

Cardiovascular disease. Chronic obstructive pulmonary disease. Carcinoma, rectal.Hypertension

 

SURGICAL HISTORY :      

Appendectomy. Cholecystectomy.

 

ENCOUNTER:      

Initial

 

ACUITY:      

1 day

 

PAIN SCALE:      

8/10

 

LOCATION:         

Abdomen

 

TECHNIQUE:     

Volumetric scanning of the abdomen and pelvis was performed.  Using automated exposure control and ad
justment of the mA and/or kV according to patient size, radiation dose was kept as low as reasonably 
achievable to obtain optimal diagnostic quality images.  DICOM format image data is available electro
nically for review and comparison.  

 

FINDINGS:     

 

LOWER LUNGS:     

Diffuse interstitial and patchy airspace consolidation in the right lung base and inferior right midd
le lobe.

 

LIVER:     

Homogeneous density without lesion.  There is no dilation of the biliary tree. Gallbladder is surgica
lly absent. CBD is mildly distended likely due to reservoir effect.

 

SPLEEN:     

Normal size without lesion.

 

PANCREAS:     

Within normal limits.

 

KIDNEYS:     

Normal in size and shape.  There is no mass, stone or hydronephrosis.

 

ADRENAL GLANDS:     

Within normal limits.

 

VASCULAR:     

Diffuse atherosclerotic calcific lesions of the distal aorta and proximal iliac arteries bilaterally.


 

BOWEL/MESENTERY:     

Moderate hiatal hernia. Mild diffuse rectal wall thickening and subtle is enteric stranding. Small to
 moderate amount of stool the rectum. No significant perirectal fluid collection, pneumatosis or free
 air. Bowel otherwise appears unremarkable.

 

RETROPERITONEUM:     

There is no lymphadenopathy. 

 

BLADDER:     

Nearly decompressed.

 

REPRODUCTIVE:     

Uterus is not visualized and may be surgically absent.

 

INGUINAL:     

Dextroscoliosis of the lumbar spine centered at L1 with multilevel degenerative spondylosis.

 

MUSCULOSKELETAL:     

Within normal limits for patient age. 

 

CONCLUSION:     

1. Perirectal posttreatment change without evidence for abscess or perforation.

2. Right lung base and inferior right middle lobe interstitial and air space consolidation concerning
 for pneumonia or aspiration. This is a new finding since 1/24/2018 exam.

3. Additional ancillary findings, as above.

 

 

 

 Alvaro Gage MD on April 30, 2018 at 17:30           

Board Certified Radiologist.

 This report was verified electronically.

## 2018-04-30 NOTE — RADRPT
EXAM DATE/TIME:  04/30/2018 18:19 

 

[facility] COMPARISON:     

CHEST SINGLE AP, April 30, 2018, 17:42.

 

                     

INDICATIONS :     

Evaluate for pneumothorax

                     

 

MEDICAL HISTORY :            

Cardiovascular disease. Chronic obstructive pulmonary disease. Carcinoma, rectal.Hypertension   

 

SURGICAL HISTORY :        

Appendectomy. Cholecystectomy, infusaport

 

ENCOUNTER:     

Subsequent                                        

 

ACUITY:     

1 day      

 

PAIN SCORE:     

0/10

 

LOCATION:     

Right chest 

 

FINDINGS:     

[id number] tip in superior vena cava. Dense consolidation right lung base most characteristic of p
neumonia or aspiration. Left lung clear. Trace right pleural fluid. Heart size within normal limits. 
Tortuous aorta. Mild scoliosis.

 

CONCLUSION:     

1. Right-sided basilar airspace disease. Differential diagnosis includes pneumonia and aspiration.

 

 

 

 Tom Hinson MD on April 30, 2018 at 18:41           

Board Certified Radiologist.

 This report was verified electronically.

## 2018-04-30 NOTE — PD
Data


Data


Last Documented VS





Vital Signs








  Date Time  Temp Pulse Resp B/P (MAP) Pulse Ox O2 Delivery O2 Flow Rate FiO2


 


4/30/18 19:14  97 18 116/96 (103) 97 Room Air  


 


4/30/18 12:58 97.7       








Orders





 Orders


Complete Blood Count With Diff (4/30/18 13:01)


Comprehensive Metabolic Panel (4/30/18 13:01)


Lipase (4/30/18 13:01)


Prothrombin Time / Inr (Pt) (4/30/18 13:01)


Act Partial Throm Time (Ptt) (4/30/18 13:01)


Urinalysis - C+S If Indicated (4/30/18 13:01)


Ct Abd/Pel W Iv Contrast(Rout) (4/30/18 15:44)


Fleets Enema (Adult) (Fleets Enema (Adul (4/30/18 15:45)


Urine Culture (4/30/18 14:46)


Sodium Chlorid 0.9% 500 Ml Inj (Ns 500 M (4/30/18 16:00)


Iohexol 350 Inj (Omnipaque 350 Inj) (4/30/18 16:56)


Chest, Single Ap (4/30/18 )


Ceftriaxone Inj (Rocephin Inj) (4/30/18 17:45)


Azithromycin Inj (Zithromax Inj) (4/30/18 17:45)


Blood Culture (4/30/18 18:06)


Chest, Single Ap (4/30/18 )


Metronidazole 500 Mg Inj (Flagyl 500 Mg (4/30/18 19:10)


Admit Order (Ed Use Only) (4/30/18 19:18)





Labs





Laboratory Tests








Test


  4/30/18


14:46 4/30/18


15:03


 


Urine Color YELLOW  


 


Urine Turbidity HAZY  


 


Urine pH 6.0  


 


Urine Specific Gravity 1.028  


 


Urine Protein 30 mg/dL  


 


Urine Glucose (UA) NEG mg/dL  


 


Urine Ketones 40 mg/dL  


 


Urine Occult Blood NEG  


 


Urine Nitrite NEG  


 


Urine Bilirubin NEG  


 


Urine Urobilinogen 8.0 MG/DL  


 


Urine Leukocyte Esterase TRACE  


 


Urine RBC 1 /hpf  


 


Urine WBC 9 /hpf  


 


Urine Squamous Epithelial


Cells 3 /hpf 


  


 


 


Urine Bacteria OCC /hpf  


 


Urine Mucus MANY /lpf  


 


Microscopic Urinalysis Comment


  CULTURE


INDICATED 


 


 


White Blood Count  5.3 TH/MM3 


 


Red Blood Count  3.32 MIL/MM3 


 


Hemoglobin  10.1 GM/DL 


 


Hematocrit  30.0 % 


 


Mean Corpuscular Volume  90.5 FL 


 


Mean Corpuscular Hemoglobin  30.4 PG 


 


Mean Corpuscular Hemoglobin


Concent 


  33.5 % 


 


 


Red Cell Distribution Width  33.4 % 


 


Platelet Count  272 TH/MM3 


 


Mean Platelet Volume  7.8 FL 


 


Neutrophils (%) (Auto)  84.8 % 


 


Lymphocytes (%) (Auto)  5.9 % 


 


Monocytes (%) (Auto)  9.0 % 


 


Eosinophils (%) (Auto)  0.2 % 


 


Basophils (%) (Auto)  0.1 % 


 


Neutrophils # (Auto)  4.5 TH/MM3 


 


Lymphocytes # (Auto)  0.3 TH/MM3 


 


Monocytes # (Auto)  0.5 TH/MM3 


 


Eosinophils # (Auto)  0.0 TH/MM3 


 


Basophils # (Auto)  0.0 TH/MM3 


 


CBC Comment  AUTO DIFF 


 


Differential Total Cells


Counted 


  100 


 


 


Neutrophils % (Manual)  81 % 


 


Band Neutrophils %  6 % 


 


Lymphocytes %  5 % 


 


Monocytes %  5 % 


 


Neutrophils # (Manual)  4.8 TH/MM3 


 


Metamyelocytes  1 % 


 


Myelocytes  2 % 


 


Nucleated Red Blood Cells  1 /100 WBC 


 


Differential Comment


  


  FINAL DIFF


MANUAL


 


Platelet Estimate  NORMAL 


 


Platelet Morphology Comment  NORMAL 


 


Prothrombin Time  9.5 SEC 


 


Prothromb Time International


Ratio 


  0.9 RATIO 


 


 


Activated Partial


Thromboplast Time 


  23.6 SEC 


 


 


Blood Urea Nitrogen  11 MG/DL 


 


Creatinine  0.65 MG/DL 


 


Random Glucose  79 MG/DL 


 


Total Protein  7.2 GM/DL 


 


Albumin  2.5 GM/DL 


 


Calcium Level  9.0 MG/DL 


 


Alkaline Phosphatase  225 U/L 


 


Aspartate Amino Transf


(AST/SGOT) 


  25 U/L 


 


 


Alanine Aminotransferase


(ALT/SGPT) 


  18 U/L 


 


 


Total Bilirubin  0.8 MG/DL 


 


Sodium Level  138 MEQ/L 


 


Potassium Level  3.1 MEQ/L 


 


Chloride Level  99 MEQ/L 


 


Carbon Dioxide Level  26.3 MEQ/L 


 


Anion Gap  13 MEQ/L 


 


Estimat Glomerular Filtration


Rate 


  90 ML/MIN 


 


 


Lipase  35 U/L 











MDM


Medical Record Reviewed:  Yes


Supervised Visit with BRIGIDA:  Yes


Narrative Course


******* This report is in ERROR *******


 ***** Please disregard this report ****


  ******* and all prior copies ! ******





******* This report is in ERROR *******


 ***** Please disregard this report ****


  ******* and all prior copies ! ******





******* This report is in ERROR *******


 ***** Please disregard this report ****


  ******* and all prior copies ! ******











Andres Boyce MD Apr 30, 2018 18:46

## 2018-05-01 VITALS
RESPIRATION RATE: 17 BRPM | SYSTOLIC BLOOD PRESSURE: 139 MMHG | OXYGEN SATURATION: 95 % | HEART RATE: 91 BPM | DIASTOLIC BLOOD PRESSURE: 65 MMHG | TEMPERATURE: 98.8 F

## 2018-05-01 VITALS — OXYGEN SATURATION: 94 %

## 2018-05-01 VITALS
HEART RATE: 98 BPM | RESPIRATION RATE: 17 BRPM | OXYGEN SATURATION: 92 % | DIASTOLIC BLOOD PRESSURE: 53 MMHG | SYSTOLIC BLOOD PRESSURE: 115 MMHG | TEMPERATURE: 98.4 F

## 2018-05-01 VITALS
SYSTOLIC BLOOD PRESSURE: 115 MMHG | RESPIRATION RATE: 17 BRPM | HEART RATE: 95 BPM | OXYGEN SATURATION: 94 % | TEMPERATURE: 98.7 F | DIASTOLIC BLOOD PRESSURE: 58 MMHG

## 2018-05-01 VITALS — OXYGEN SATURATION: 96 %

## 2018-05-01 VITALS
OXYGEN SATURATION: 95 % | SYSTOLIC BLOOD PRESSURE: 144 MMHG | DIASTOLIC BLOOD PRESSURE: 65 MMHG | RESPIRATION RATE: 17 BRPM | HEART RATE: 83 BPM | TEMPERATURE: 97.8 F

## 2018-05-01 VITALS
RESPIRATION RATE: 17 BRPM | DIASTOLIC BLOOD PRESSURE: 65 MMHG | HEART RATE: 93 BPM | OXYGEN SATURATION: 94 % | SYSTOLIC BLOOD PRESSURE: 147 MMHG | TEMPERATURE: 98.5 F

## 2018-05-01 VITALS
HEART RATE: 92 BPM | DIASTOLIC BLOOD PRESSURE: 58 MMHG | RESPIRATION RATE: 17 BRPM | SYSTOLIC BLOOD PRESSURE: 122 MMHG | OXYGEN SATURATION: 93 % | TEMPERATURE: 98.2 F

## 2018-05-01 LAB
BASOPHILS # BLD AUTO: 0 TH/MM3 (ref 0–0.2)
BASOPHILS NFR BLD: 0.3 % (ref 0–2)
BUN SERPL-MCNC: 9 MG/DL (ref 7–18)
CALCIUM SERPL-MCNC: 7.9 MG/DL (ref 8.5–10.1)
CHLORIDE SERPL-SCNC: 104 MEQ/L (ref 98–107)
CREAT SERPL-MCNC: 0.46 MG/DL (ref 0.5–1)
EOSINOPHIL # BLD: 0 TH/MM3 (ref 0–0.4)
EOSINOPHIL NFR BLD: 0.4 % (ref 0–4)
ERYTHROCYTE [DISTWIDTH] IN BLOOD BY AUTOMATED COUNT: 33 % (ref 11.6–17.2)
GFR SERPLBLD BASED ON 1.73 SQ M-ARVRAT: 134 ML/MIN (ref 89–?)
GLUCOSE SERPL-MCNC: 66 MG/DL (ref 74–106)
HCO3 BLD-SCNC: 21.8 MEQ/L (ref 21–32)
HCT VFR BLD CALC: 22.4 % (ref 35–46)
HGB BLD-MCNC: 7.9 GM/DL (ref 11.6–15.3)
LYMPHOCYTES # BLD AUTO: 0.2 TH/MM3 (ref 1–4.8)
LYMPHOCYTES NFR BLD AUTO: 6 % (ref 9–44)
LYMPHOCYTES: 6 % (ref 9–44)
MCH RBC QN AUTO: 32.1 PG (ref 27–34)
MCHC RBC AUTO-ENTMCNC: 35.1 % (ref 32–36)
MCV RBC AUTO: 91.2 FL (ref 80–100)
MONOCYTE #: 0.4 TH/MM3 (ref 0–0.9)
MONOCYTES NFR BLD: 11.1 % (ref 0–8)
MONOCYTES: 3 % (ref 0–8)
MYELOCYTES NFR BLD: 1 % (ref 0–0)
NEUTROPHILS # BLD AUTO: 3.1 TH/MM3 (ref 1.8–7.7)
NEUTROPHILS NFR BLD AUTO: 82.2 % (ref 16–70)
NEUTS BAND # BLD MANUAL: 3.4 TH/MM3 (ref 1.8–7.7)
NEUTS BAND NFR BLD: 8 % (ref 0–6)
NEUTS SEG NFR BLD MANUAL: 81 % (ref 16–70)
PLATELET # BLD: 201 TH/MM3 (ref 150–450)
PMV BLD AUTO: 7.8 FL (ref 7–11)
RBC # BLD AUTO: 2.46 MIL/MM3 (ref 4–5.3)
SODIUM SERPL-SCNC: 138 MEQ/L (ref 136–145)
WBC # BLD AUTO: 3.8 TH/MM3 (ref 4–11)

## 2018-05-01 RX ADMIN — PANTOPRAZOLE SCH MG: 40 TABLET, DELAYED RELEASE ORAL at 09:15

## 2018-05-01 RX ADMIN — STANDARDIZED SENNA CONCENTRATE AND DOCUSATE SODIUM SCH TAB: 8.6; 5 TABLET, FILM COATED ORAL at 20:44

## 2018-05-01 RX ADMIN — POTASSIUM CHLORIDE SCH MLS/HR: 10 INJECTION, SOLUTION INTRAVENOUS at 14:47

## 2018-05-01 RX ADMIN — PHENYTOIN SODIUM SCH MLS/HR: 50 INJECTION INTRAMUSCULAR; INTRAVENOUS at 05:19

## 2018-05-01 RX ADMIN — POTASSIUM CHLORIDE SCH MLS/HR: 10 INJECTION, SOLUTION INTRAVENOUS at 12:03

## 2018-05-01 RX ADMIN — SUCRALFATE SCH GM: 1 TABLET ORAL at 09:16

## 2018-05-01 RX ADMIN — POTASSIUM CHLORIDE SCH MLS/HR: 10 INJECTION, SOLUTION INTRAVENOUS at 19:31

## 2018-05-01 RX ADMIN — BUDESONIDE AND FORMOTEROL FUMARATE DIHYDRATE SCH PUFF: 160; 4.5 AEROSOL RESPIRATORY (INHALATION) at 09:24

## 2018-05-01 RX ADMIN — IPRATROPIUM BROMIDE AND ALBUTEROL SULFATE SCH AMPULE: .5; 3 SOLUTION RESPIRATORY (INHALATION) at 03:35

## 2018-05-01 RX ADMIN — SODIUM CHLORIDE SCH MLS/HR: 900 INJECTION, SOLUTION INTRAVENOUS at 13:41

## 2018-05-01 RX ADMIN — ENOXAPARIN SODIUM SCH MG: 40 INJECTION SUBCUTANEOUS at 20:45

## 2018-05-01 RX ADMIN — BUDESONIDE AND FORMOTEROL FUMARATE DIHYDRATE SCH PUFF: 160; 4.5 AEROSOL RESPIRATORY (INHALATION) at 20:54

## 2018-05-01 RX ADMIN — SODIUM CHLORIDE SCH MLS/HR: 900 INJECTION, SOLUTION INTRAVENOUS at 20:55

## 2018-05-01 RX ADMIN — Medication SCH ML: at 20:56

## 2018-05-01 RX ADMIN — Medication SCH ML: at 09:00

## 2018-05-01 RX ADMIN — PHENYTOIN SODIUM SCH MLS/HR: 50 INJECTION INTRAMUSCULAR; INTRAVENOUS at 16:00

## 2018-05-01 RX ADMIN — SODIUM CHLORIDE SCH MLS/HR: 900 INJECTION, SOLUTION INTRAVENOUS at 04:47

## 2018-05-01 RX ADMIN — SUCRALFATE SCH GM: 1 TABLET ORAL at 13:42

## 2018-05-01 RX ADMIN — IPRATROPIUM BROMIDE AND ALBUTEROL SULFATE SCH AMPULE: .5; 3 SOLUTION RESPIRATORY (INHALATION) at 21:12

## 2018-05-01 RX ADMIN — SUCRALFATE SCH GM: 1 TABLET ORAL at 18:10

## 2018-05-01 RX ADMIN — IPRATROPIUM BROMIDE AND ALBUTEROL SULFATE SCH AMPULE: .5; 3 SOLUTION RESPIRATORY (INHALATION) at 15:35

## 2018-05-01 RX ADMIN — PRAVASTATIN SODIUM SCH MG: 40 TABLET ORAL at 09:15

## 2018-05-01 RX ADMIN — POTASSIUM CHLORIDE SCH MLS/HR: 10 INJECTION, SOLUTION INTRAVENOUS at 22:09

## 2018-05-01 RX ADMIN — IPRATROPIUM BROMIDE AND ALBUTEROL SULFATE SCH AMPULE: .5; 3 SOLUTION RESPIRATORY (INHALATION) at 09:26

## 2018-05-01 RX ADMIN — MONTELUKAST SODIUM SCH MG: 10 TABLET, COATED ORAL at 20:44

## 2018-05-01 RX ADMIN — ONDANSETRON PRN MG: 2 INJECTION, SOLUTION INTRAMUSCULAR; INTRAVENOUS at 18:10

## 2018-05-01 RX ADMIN — POTASSIUM CHLORIDE SCH MLS/HR: 10 INJECTION, SOLUTION INTRAVENOUS at 10:55

## 2018-05-01 RX ADMIN — STANDARDIZED SENNA CONCENTRATE AND DOCUSATE SODIUM SCH TAB: 8.6; 5 TABLET, FILM COATED ORAL at 09:23

## 2018-05-01 NOTE — RADRPT
EXAM DATE/TIME:  05/01/2018 00:00 

 

[facility] COMPARISON:     

No previous studies available for comparison.

 

                     

INDICATIONS :     

Dysphagia. pneumonia vs aspiration pneumonia

                     

 

FLUORO TIME:     

1.9 minutes

 

IMAGE COUNT:     

0

                     

 

CONTRAST:     

Dose as prescribed by speech pathologist.

                     

 

MEDICAL HISTORY :     

Cardiovascular disease.  Chronic obstructive pulmonary disease.  Carcinoma, rectal.   hypertension   


 

SURGICAL HISTORY :     

Appendectomy. Cholecystectomy.  infusaport

 

ENCOUNTER:     

Initial                                        

 

ACUITY:     

2 weeks      

 

PAIN SCORE:     

0/10

 

LOCATION:     

Bilateral neck 

 

FINDINGS:     

A modified barium swallow was performed with speech pathology. Patient was given a variety of liquids
 to swallow.

 

For a full detailed report, see report by the speech pathologist.

 

CONCLUSION:     No episodes of aspiration observed.

 

 

 

 Rayo Dominguez MD on May 01, 2018 at 10:18           

Board Certified Radiologist.

 This report was verified electronically.

## 2018-05-02 VITALS
OXYGEN SATURATION: 92 % | DIASTOLIC BLOOD PRESSURE: 60 MMHG | RESPIRATION RATE: 18 BRPM | HEART RATE: 92 BPM | SYSTOLIC BLOOD PRESSURE: 134 MMHG | TEMPERATURE: 98.5 F

## 2018-05-02 VITALS
OXYGEN SATURATION: 93 % | DIASTOLIC BLOOD PRESSURE: 77 MMHG | HEART RATE: 85 BPM | TEMPERATURE: 98.5 F | SYSTOLIC BLOOD PRESSURE: 146 MMHG | RESPIRATION RATE: 18 BRPM

## 2018-05-02 VITALS
OXYGEN SATURATION: 92 % | RESPIRATION RATE: 17 BRPM | TEMPERATURE: 98.4 F | SYSTOLIC BLOOD PRESSURE: 123 MMHG | DIASTOLIC BLOOD PRESSURE: 58 MMHG | HEART RATE: 94 BPM

## 2018-05-02 VITALS
TEMPERATURE: 99.3 F | HEART RATE: 88 BPM | RESPIRATION RATE: 18 BRPM | OXYGEN SATURATION: 95 % | SYSTOLIC BLOOD PRESSURE: 124 MMHG | DIASTOLIC BLOOD PRESSURE: 58 MMHG

## 2018-05-02 VITALS
SYSTOLIC BLOOD PRESSURE: 138 MMHG | OXYGEN SATURATION: 95 % | RESPIRATION RATE: 18 BRPM | TEMPERATURE: 98.5 F | HEART RATE: 88 BPM | DIASTOLIC BLOOD PRESSURE: 63 MMHG

## 2018-05-02 VITALS
TEMPERATURE: 97.3 F | DIASTOLIC BLOOD PRESSURE: 63 MMHG | OXYGEN SATURATION: 95 % | SYSTOLIC BLOOD PRESSURE: 129 MMHG | HEART RATE: 91 BPM | RESPIRATION RATE: 18 BRPM

## 2018-05-02 VITALS — OXYGEN SATURATION: 97 %

## 2018-05-02 VITALS — OXYGEN SATURATION: 96 %

## 2018-05-02 LAB
ALBUMIN SERPL-MCNC: 1.8 GM/DL (ref 3.4–5)
BASOPHILS # BLD AUTO: 0 TH/MM3 (ref 0–0.2)
BASOPHILS NFR BLD: 0.2 % (ref 0–2)
BUN SERPL-MCNC: 7 MG/DL (ref 7–18)
CALCIUM SERPL-MCNC: 7.7 MG/DL (ref 8.5–10.1)
CHLORIDE SERPL-SCNC: 108 MEQ/L (ref 98–107)
CREAT SERPL-MCNC: 0.36 MG/DL (ref 0.5–1)
EOSINOPHIL # BLD: 0 TH/MM3 (ref 0–0.4)
EOSINOPHIL NFR BLD: 0.3 % (ref 0–4)
ERYTHROCYTE [DISTWIDTH] IN BLOOD BY AUTOMATED COUNT: 33.1 % (ref 11.6–17.2)
GFR SERPLBLD BASED ON 1.73 SQ M-ARVRAT: 178 ML/MIN (ref 89–?)
GLUCOSE SERPL-MCNC: 45 MG/DL (ref 74–106)
HCO3 BLD-SCNC: 16 MEQ/L (ref 21–32)
HCT VFR BLD CALC: 23.6 % (ref 35–46)
HGB BLD-MCNC: 8.1 GM/DL (ref 11.6–15.3)
LYMPHOCYTES # BLD AUTO: 0.3 TH/MM3 (ref 1–4.8)
LYMPHOCYTES NFR BLD AUTO: 3.6 % (ref 9–44)
LYMPHOCYTES: 2 % (ref 9–44)
MAGNESIUM SERPL-MCNC: 1.8 MG/DL (ref 1.5–2.5)
MCH RBC QN AUTO: 31.4 PG (ref 27–34)
MCHC RBC AUTO-ENTMCNC: 34.2 % (ref 32–36)
MCV RBC AUTO: 91.8 FL (ref 80–100)
MONOCYTE #: 0.4 TH/MM3 (ref 0–0.9)
MONOCYTES NFR BLD: 6.1 % (ref 0–8)
MONOCYTES: 3 % (ref 0–8)
NEUTROPHILS # BLD AUTO: 6.5 TH/MM3 (ref 1.8–7.7)
NEUTROPHILS NFR BLD AUTO: 89.8 % (ref 16–70)
NEUTS BAND # BLD MANUAL: 6.8 TH/MM3 (ref 1.8–7.7)
NEUTS BAND NFR BLD: 10 % (ref 0–6)
NEUTS SEG NFR BLD MANUAL: 85 % (ref 16–70)
OVALOCYTES BLD QL SMEAR: (no result)
PHOSPHATE SERPL-MCNC: 2.3 MG/DL (ref 2.5–4.9)
PLATELET # BLD: 228 TH/MM3 (ref 150–450)
PMV BLD AUTO: 7.6 FL (ref 7–11)
RBC # BLD AUTO: 2.57 MIL/MM3 (ref 4–5.3)
SODIUM SERPL-SCNC: 138 MEQ/L (ref 136–145)
TOXIC GRANULES BLD QL SMEAR: (no result)
WBC # BLD AUTO: 7.2 TH/MM3 (ref 4–11)

## 2018-05-02 RX ADMIN — SODIUM CHLORIDE SCH MLS/HR: 900 INJECTION, SOLUTION INTRAVENOUS at 23:56

## 2018-05-02 RX ADMIN — Medication SCH ML: at 08:58

## 2018-05-02 RX ADMIN — BUDESONIDE AND FORMOTEROL FUMARATE DIHYDRATE SCH PUFF: 160; 4.5 AEROSOL RESPIRATORY (INHALATION) at 08:58

## 2018-05-02 RX ADMIN — IPRATROPIUM BROMIDE AND ALBUTEROL SULFATE SCH AMPULE: .5; 3 SOLUTION RESPIRATORY (INHALATION) at 03:42

## 2018-05-02 RX ADMIN — HEPARIN SODIUM SCH MLS/HR: 10000 INJECTION, SOLUTION INTRAVENOUS; SUBCUTANEOUS at 09:32

## 2018-05-02 RX ADMIN — BUDESONIDE AND FORMOTEROL FUMARATE DIHYDRATE SCH PUFF: 160; 4.5 AEROSOL RESPIRATORY (INHALATION) at 20:23

## 2018-05-02 RX ADMIN — SODIUM CHLORIDE SCH MLS/HR: 900 INJECTION, SOLUTION INTRAVENOUS at 17:07

## 2018-05-02 RX ADMIN — SUCRALFATE SCH GM: 1 TABLET ORAL at 08:57

## 2018-05-02 RX ADMIN — PANTOPRAZOLE SCH MG: 40 TABLET, DELAYED RELEASE ORAL at 08:57

## 2018-05-02 RX ADMIN — AZITHROMYCIN SCH MG: 250 TABLET, FILM COATED ORAL at 17:07

## 2018-05-02 RX ADMIN — Medication SCH ML: at 20:23

## 2018-05-02 RX ADMIN — SODIUM CHLORIDE SCH MLS/HR: 900 INJECTION INTRAVENOUS at 20:22

## 2018-05-02 RX ADMIN — IPRATROPIUM BROMIDE AND ALBUTEROL SULFATE SCH AMPULE: .5; 3 SOLUTION RESPIRATORY (INHALATION) at 20:35

## 2018-05-02 RX ADMIN — SODIUM CHLORIDE SCH MLS/HR: 900 INJECTION, SOLUTION INTRAVENOUS at 13:22

## 2018-05-02 RX ADMIN — SUCRALFATE SCH GM: 1 TABLET ORAL at 17:07

## 2018-05-02 RX ADMIN — STANDARDIZED SENNA CONCENTRATE AND DOCUSATE SODIUM SCH TAB: 8.6; 5 TABLET, FILM COATED ORAL at 08:57

## 2018-05-02 RX ADMIN — HEPARIN SODIUM SCH MLS/HR: 10000 INJECTION, SOLUTION INTRAVENOUS; SUBCUTANEOUS at 19:33

## 2018-05-02 RX ADMIN — MONTELUKAST SODIUM SCH MG: 10 TABLET, COATED ORAL at 20:22

## 2018-05-02 RX ADMIN — SODIUM CHLORIDE SCH MLS/HR: 900 INJECTION, SOLUTION INTRAVENOUS at 05:44

## 2018-05-02 RX ADMIN — ONDANSETRON PRN MG: 2 INJECTION, SOLUTION INTRAMUSCULAR; INTRAVENOUS at 23:56

## 2018-05-02 RX ADMIN — POTASSIUM CHLORIDE SCH MLS/HR: 10 INJECTION, SOLUTION INTRAVENOUS at 00:04

## 2018-05-02 RX ADMIN — SUCRALFATE SCH GM: 1 TABLET ORAL at 13:00

## 2018-05-02 RX ADMIN — IPRATROPIUM BROMIDE AND ALBUTEROL SULFATE SCH AMPULE: .5; 3 SOLUTION RESPIRATORY (INHALATION) at 17:01

## 2018-05-02 RX ADMIN — ENOXAPARIN SODIUM SCH MG: 40 INJECTION SUBCUTANEOUS at 20:22

## 2018-05-02 RX ADMIN — IPRATROPIUM BROMIDE AND ALBUTEROL SULFATE SCH AMPULE: .5; 3 SOLUTION RESPIRATORY (INHALATION) at 08:55

## 2018-05-02 RX ADMIN — STANDARDIZED SENNA CONCENTRATE AND DOCUSATE SODIUM SCH TAB: 8.6; 5 TABLET, FILM COATED ORAL at 20:23

## 2018-05-02 RX ADMIN — PRAVASTATIN SODIUM SCH MG: 40 TABLET ORAL at 08:58

## 2018-05-02 NOTE — HHI.PR
Subjective


Remarks





Patient seen this morning.  Says she is actually feeling better.  She is 

breathing somewhat more rapidly but denies any shortness of breath.  Denies 

abdominal pain.  She reports multiple bouts of diarrhea.





Objective





Vital Signs








  Date Time  Temp Pulse Resp B/P (MAP) Pulse Ox O2 Delivery O2 Flow Rate FiO2


 


5/2/18 20:35     97 Nasal Cannula 2.00 


 


5/2/18 19:49 97.3 91 18 129/63 (85) 95   


 


5/2/18 16:00 99.3 88 18 124/58 (80) 95   


 


5/2/18 12:00 98.5 88 18 138/63 (88) 95   


 


5/2/18 09:53     96 Nasal Cannula 2.00 


 


5/2/18 08:00 98.5 85 18 146/77 (100) 93   


 


5/2/18 03:05 98.5 92 18 134/60 (84) 92   


 


5/2/18 00:26 98.4 94 17 123/58 (79) 92   














I/O      


 


 5/2/18 5/2/18 5/2/18 5/3/18 5/3/18 5/3/18





 07:00 15:00 23:00 07:00 15:00 23:00


 


Intake Total 480 ml 100 ml 1316 ml   


 


Balance 480 ml 100 ml 1316 ml   


 


      


 


Intake Oral 480 ml  480 ml   


 


IV Total  100 ml 836 ml   


 


# Voids 5  4   


 


# Bowel Movements 4  3   








Result Diagram:  


5/2/18 0520                                                                    

            5/2/18 0936





Objective Remarks


GENERAL: Lying in bed.  Appears comfortable.


SKIN: Warm and dry.


HEAD: Normocephalic.


EYES: No scleral icterus. No injection or drainage. 


NECK: Supple, trachea midline. No JVD.


CARDIOVASCULAR: Regular rate and rhythm without murmurs, gallops, or rubs. 


RESPIRATORY: Breath sounds equal bilaterally. No accessory muscle use.


GASTROINTESTINAL: Abdomen soft, non-tender, nondistended. positive bowel sounds.


MUSCULOSKELETAL: No cyanosis, or edema. 


BACK: Nontender without obvious deformity. No CVA tenderness.








A/P


Assessment and Plan


//Pneumonia


Aspiration versus community-acquired


Chest x-ray significant for right-sided pneumonia versus aspiration, personally 

reviewed


Azithromycin, Rocephin, Flagyl cover anaerobes.


Swallow eval past.  Appreciate speech therapy assistance.





//Dysphagia over the past 2 weeks.  


= Follow-up GI recommendations.  Patient did pass speech swallow evaluation.








//Sepsis.


= Leukopenia of 3.8, tachycardia of 95 this morning.


= Suspected aspiration pneumonia.


= Possible UTI.


= Cultures pending.


= Continue broad-spectrum antibiotics including metronidazole for coverage of 

aspiration on


5/2.  Leukopenia resolved.  Still with 10% bands.





//UTI


UA consistent with urinary tract infection


Urine culture pending


Antibiotics as above


= 5/2.  Mixed gram positives.  Continue treating for pneumonia above.





//Constipation.  Resolved after laxatives.





//Non-anion gapMetabolic acidosis.


= With respiratory compensation.  ABG reviewed.  Likely bicarbonate losses from 

diarrhea.  Will start on bicarbonate drip.  Recheck labs tomorrow.





//Diarrhea.  Likely combination of laxatives and antibiotics.  Check C. 

difficile.  Discontinue laxatives.





//Hypokalemia.  Replace.  Monitor.





// COPD


Duo nebs


Continue home Symbicort


Antibiotics as above





//GERD/Kerr's esophagus


Continue PPI





// History of rectal cancer


Status post chemotherapy and radiation


Follow-up as outpatient with oncologist





// Fecal incontinence


Suspect secondary to postradiation changes


CT of the abdomen/pelvis showed no evidence of abscess or perforation without 

stool buildup, personally reviewed





Patient lives at home alone, believes she is no longer safe to care for herself 

without assistance.  Case management consulted to assist with placement.





FEN


Regular diet


Electrolytes: s/p PO potassium, repeat BMP


Lovenox


Discharge Planning


DVT recommends rehabilitation.  Hopefully discharge to rehabilitation when 

improved, possibly 2 days.











Dannie Shaw MD May 2, 2018 23:41

## 2018-05-03 VITALS
HEART RATE: 89 BPM | DIASTOLIC BLOOD PRESSURE: 60 MMHG | TEMPERATURE: 98.3 F | OXYGEN SATURATION: 92 % | RESPIRATION RATE: 18 BRPM | SYSTOLIC BLOOD PRESSURE: 131 MMHG

## 2018-05-03 VITALS
TEMPERATURE: 98.6 F | SYSTOLIC BLOOD PRESSURE: 112 MMHG | RESPIRATION RATE: 18 BRPM | HEART RATE: 94 BPM | OXYGEN SATURATION: 92 % | DIASTOLIC BLOOD PRESSURE: 62 MMHG

## 2018-05-03 VITALS
DIASTOLIC BLOOD PRESSURE: 59 MMHG | TEMPERATURE: 98.5 F | HEART RATE: 87 BPM | RESPIRATION RATE: 18 BRPM | OXYGEN SATURATION: 92 % | SYSTOLIC BLOOD PRESSURE: 123 MMHG

## 2018-05-03 VITALS
DIASTOLIC BLOOD PRESSURE: 57 MMHG | HEART RATE: 89 BPM | OXYGEN SATURATION: 93 % | RESPIRATION RATE: 17 BRPM | SYSTOLIC BLOOD PRESSURE: 117 MMHG | TEMPERATURE: 98.7 F

## 2018-05-03 VITALS
SYSTOLIC BLOOD PRESSURE: 127 MMHG | RESPIRATION RATE: 18 BRPM | DIASTOLIC BLOOD PRESSURE: 66 MMHG | OXYGEN SATURATION: 97 % | HEART RATE: 88 BPM | TEMPERATURE: 98.3 F

## 2018-05-03 VITALS
SYSTOLIC BLOOD PRESSURE: 120 MMHG | DIASTOLIC BLOOD PRESSURE: 57 MMHG | RESPIRATION RATE: 17 BRPM | HEART RATE: 88 BPM | TEMPERATURE: 98.6 F | OXYGEN SATURATION: 93 %

## 2018-05-03 VITALS — OXYGEN SATURATION: 93 %

## 2018-05-03 LAB
ALBUMIN SERPL-MCNC: 1.7 GM/DL (ref 3.4–5)
BASOPHILS # BLD AUTO: 0 TH/MM3 (ref 0–0.2)
BASOPHILS NFR BLD: 0.2 % (ref 0–2)
BUN SERPL-MCNC: 3 MG/DL (ref 7–18)
CALCIUM SERPL-MCNC: 7.6 MG/DL (ref 8.5–10.1)
CHLORIDE SERPL-SCNC: 104 MEQ/L (ref 98–107)
CREAT SERPL-MCNC: 0.42 MG/DL (ref 0.5–1)
EOSINOPHIL # BLD: 0 TH/MM3 (ref 0–0.4)
EOSINOPHIL NFR BLD: 1 % (ref 0–4)
ERYTHROCYTE [DISTWIDTH] IN BLOOD BY AUTOMATED COUNT: 33.2 % (ref 11.6–17.2)
GFR SERPLBLD BASED ON 1.73 SQ M-ARVRAT: 149 ML/MIN (ref 89–?)
GLUCOSE SERPL-MCNC: 103 MG/DL (ref 74–106)
HCO3 BLD-SCNC: 27.6 MEQ/L (ref 21–32)
HCT VFR BLD CALC: 23.9 % (ref 35–46)
HGB BLD-MCNC: 8.3 GM/DL (ref 11.6–15.3)
LYMPHOCYTES # BLD AUTO: 0.3 TH/MM3 (ref 1–4.8)
LYMPHOCYTES NFR BLD AUTO: 5.1 % (ref 9–44)
MAGNESIUM SERPL-MCNC: 1.7 MG/DL (ref 1.5–2.5)
MCH RBC QN AUTO: 31.8 PG (ref 27–34)
MCHC RBC AUTO-ENTMCNC: 34.7 % (ref 32–36)
MCV RBC AUTO: 91.4 FL (ref 80–100)
MONOCYTE #: 0.5 TH/MM3 (ref 0–0.9)
MONOCYTES NFR BLD: 9.5 % (ref 0–8)
NEUTROPHILS # BLD AUTO: 4.2 TH/MM3 (ref 1.8–7.7)
NEUTROPHILS NFR BLD AUTO: 84.2 % (ref 16–70)
PHOSPHATE SERPL-MCNC: 1.6 MG/DL (ref 2.5–4.9)
PLATELET # BLD: 226 TH/MM3 (ref 150–450)
PMV BLD AUTO: 7.7 FL (ref 7–11)
RBC # BLD AUTO: 2.61 MIL/MM3 (ref 4–5.3)
SODIUM SERPL-SCNC: 138 MEQ/L (ref 136–145)
WBC # BLD AUTO: 5 TH/MM3 (ref 4–11)

## 2018-05-03 RX ADMIN — ONDANSETRON PRN MG: 2 INJECTION, SOLUTION INTRAMUSCULAR; INTRAVENOUS at 13:12

## 2018-05-03 RX ADMIN — BUDESONIDE AND FORMOTEROL FUMARATE DIHYDRATE SCH PUFF: 160; 4.5 AEROSOL RESPIRATORY (INHALATION) at 23:25

## 2018-05-03 RX ADMIN — ONDANSETRON PRN MG: 2 INJECTION, SOLUTION INTRAMUSCULAR; INTRAVENOUS at 19:10

## 2018-05-03 RX ADMIN — MONTELUKAST SODIUM SCH MG: 10 TABLET, COATED ORAL at 23:24

## 2018-05-03 RX ADMIN — HEPARIN SODIUM SCH MLS/HR: 10000 INJECTION, SOLUTION INTRAVENOUS; SUBCUTANEOUS at 01:41

## 2018-05-03 RX ADMIN — SODIUM CHLORIDE SCH MLS/HR: 900 INJECTION, SOLUTION INTRAVENOUS at 05:21

## 2018-05-03 RX ADMIN — IPRATROPIUM BROMIDE AND ALBUTEROL SULFATE SCH AMPULE: .5; 3 SOLUTION RESPIRATORY (INHALATION) at 20:09

## 2018-05-03 RX ADMIN — ONDANSETRON PRN MG: 2 INJECTION, SOLUTION INTRAMUSCULAR; INTRAVENOUS at 07:12

## 2018-05-03 RX ADMIN — STANDARDIZED SENNA CONCENTRATE AND DOCUSATE SODIUM SCH TAB: 8.6; 5 TABLET, FILM COATED ORAL at 21:00

## 2018-05-03 RX ADMIN — ENOXAPARIN SODIUM SCH MG: 40 INJECTION SUBCUTANEOUS at 20:28

## 2018-05-03 RX ADMIN — SUCRALFATE SCH GM: 1 TABLET ORAL at 13:13

## 2018-05-03 RX ADMIN — AZITHROMYCIN SCH MG: 250 TABLET, FILM COATED ORAL at 17:52

## 2018-05-03 RX ADMIN — SODIUM CHLORIDE SCH MLS/HR: 900 INJECTION, SOLUTION INTRAVENOUS at 13:13

## 2018-05-03 RX ADMIN — Medication SCH ML: at 08:30

## 2018-05-03 RX ADMIN — Medication SCH ML: at 23:24

## 2018-05-03 RX ADMIN — SODIUM CHLORIDE SCH MLS/HR: 900 INJECTION INTRAVENOUS at 20:28

## 2018-05-03 RX ADMIN — FAMOTIDINE SCH MG: 20 TABLET, FILM COATED ORAL at 23:23

## 2018-05-03 RX ADMIN — SODIUM CHLORIDE SCH MLS/HR: 900 INJECTION, SOLUTION INTRAVENOUS at 17:52

## 2018-05-03 RX ADMIN — SUCRALFATE SCH GM: 1 TABLET ORAL at 17:52

## 2018-05-03 RX ADMIN — STANDARDIZED SENNA CONCENTRATE AND DOCUSATE SODIUM SCH TAB: 8.6; 5 TABLET, FILM COATED ORAL at 08:30

## 2018-05-03 RX ADMIN — SUCRALFATE SCH GM: 1 TABLET ORAL at 08:29

## 2018-05-03 RX ADMIN — IPRATROPIUM BROMIDE AND ALBUTEROL SULFATE SCH AMPULE: .5; 3 SOLUTION RESPIRATORY (INHALATION) at 09:47

## 2018-05-03 RX ADMIN — IPRATROPIUM BROMIDE AND ALBUTEROL SULFATE SCH AMPULE: .5; 3 SOLUTION RESPIRATORY (INHALATION) at 03:57

## 2018-05-03 RX ADMIN — BUDESONIDE AND FORMOTEROL FUMARATE DIHYDRATE SCH PUFF: 160; 4.5 AEROSOL RESPIRATORY (INHALATION) at 08:31

## 2018-05-03 RX ADMIN — PANTOPRAZOLE SCH MG: 40 TABLET, DELAYED RELEASE ORAL at 08:29

## 2018-05-03 RX ADMIN — IPRATROPIUM BROMIDE AND ALBUTEROL SULFATE SCH AMPULE: .5; 3 SOLUTION RESPIRATORY (INHALATION) at 17:26

## 2018-05-03 RX ADMIN — PRAVASTATIN SODIUM SCH MG: 40 TABLET ORAL at 08:29

## 2018-05-03 NOTE — MB
cc:

Yvette Jarrell MD

****

 

 

DATE:

05/03/2018

 

YOB: 1945

 

REASON FOR CONSULTATION:

Dysphagia.

 

HISTORY OF PRESENT ILLNESS:

This is a very pleasant 72-year-old  female, well known to me

from the office.  She has a longstanding history of hiatal hernia, 

chronic esophagitis and GERD as well as a history of Kerr's 

esophagus.  She recently underwent a colonoscopy, was found to have a 

large perianal rectal mass in the anal canal which has been under 

treatment with chemo and radiation with Dr. Jovanni England.  She currently 

has finished her chemotherapy and radiation therapy and is due for 

further surveillance in 6 months.  In the interim, she was at home, 

feeling weak, fatigued, is having issues with anorexia, worsening of 

her symptoms; therefore, she came into the hospital for further workup

and evaluation.  She was noted to have a suspected aspiration 

pneumonia on the right lung.  GI was consulted due to the possible 

aspiration/dysphagia.

 

PAST MEDICAL HISTORY:

Anorectal cancer, Kerr's esophagus, GERD, COPD, large hiatal 

hernia.

 

PAST SURGICAL HISTORY:

Distant history of small bowel resection, cholecystectomy, 

appendectomy, hysterectomy.

 

ALLERGIES:

LATEX.

 

HOME MEDICATIONS:

1.  Symbicort.

2.  Ventolin.

3.  DuoNeb.

4.  Dicyclomine.

5.  Amitiza.

6.  Sucralfate 1 gram t.i.d.

7.  Pantoprazole 40 mg b.i.d.

8.  Montelukast.

9.  Simvastatin.

 

FAMILY HISTORY:

No GI malignancy.

 

SOCIAL HISTORY:

Smokes 5 cigarettes per day.  Denies any illicit drug use or alcohol 

use.

 

REVIEW OF SYSTEMS:

A 12-point review of system was obtained by me and noted to negative 

or noncontributory except for the above-mentioned in the HPI.

 

PHYSICAL EXAMINATION:

VITAL SIGNS:  Normal.

GENERAL:  Alert, oriented.  No focal deficits noted.

HEENT:  Eyes:  Pupils equal, round, reactive.  Head is normocephalic. 

Oral mucosa is moist.

NECK:  Supple, nontender.  No carotid bruits.

CARDIOVASCULAR:  Regular rate and rhythm.  No murmurs heard.

RESPIRATORY:  Clear to auscultation.  No wheezing.  Slight coughing 

and slight rales, worse on the right than the left.

ABDOMEN:  Soft, nontender, nondistended.  Bowel sounds present in all 

4 quadrants.  No hepatosplenomegaly appreciated.

GENITOURINARY:  No CVA tenderness.

MUSCULOSKELETAL:  Equal strength in upper and lower extremities.

SKIN:  Warm, dry, intact.

NEUROLOGIC:  Alert and oriented.  No focal deficits.

PSYCHIATRIC:  Cooperative, appropriate mood and affect.

 

LABORATORY DATA:

WBC 5.0, hemoglobin 8.8, platelet count of 226.

Sodium 138, potassium 3.4, chloride 104, bicarbonate 27.

 

IMPRESSION:

1.  Right-sided pneumonia, may be secondary to aspiration.

2.  Large hiatal hernia, likely contributing to the possibility of 

aspiration.  She has had hiatal hernia for a prolonged period of time 

leading to esophagitis and Kerr's esophagus.  This has been 

evaluated as an outpatient.

3.  Kerr's esophagus.

4.  History of anal cancer, status post chemoradiation.

 

RECOMMENDATIONS:

1.  Due to the severity of her GERD symptoms, I recommend pantoprazole

40 mg b.i.d. p.o.

2.  Carafate 1 gram t.i.d. crushed and drank as an elixir.

3.  Add Zantac 150 mg at bedtime.

4.  Recommend reverse Trendelenburg bed elevation or at least keep the

head of the bed 30 degrees or more when supine to reduce the chance of

aspiration.  The patient is not aspirating from the swallowing.  She 

likely is aspirating during times of being flat or supine.  I 

discussed this with the patient in great detail.

 

She has had endoscopic workup in the recent past.  Therefore, I do not

believe urgent endoscopy is necessary at this time.

 

Thank you for allowing [facility] to participate in the 

care of this patient.  We will follow along with you and make 

recommendation as needed.

 

 

__________________________________

MD ODIN Dillon/TOMMY

D: 05/03/2018, 11:21 AM

T: 05/03/2018, 11:53 AM

Visit #: [id number]

Job #: [id number]

## 2018-05-03 NOTE — HHI.PR
Subjective


Remarks


Patient seen this morning.  Says she is feeling a little better.  Breathing 

more comfortably.  Says that swallowing has improved.





Objective





Vital Signs








  Date Time  Temp Pulse Resp B/P (MAP) Pulse Ox O2 Delivery O2 Flow Rate FiO2


 


5/3/18 20:10     93   21


 


5/3/18 16:00 98.5 87 18 123/59 (80) 92   


 


5/3/18 12:00 98.6 94 18 112/62 (79) 92   


 


5/3/18 09:49     93   21


 


5/3/18 08:00 98.3 89 18 131/60 (83) 92   


 


5/3/18 05:23     97 Room Air  


 


5/3/18 00:01 98.3 88 18 127/66 (86) 97   














I/O      


 


 5/3/18 5/3/18 5/3/18 5/4/18 5/4/18 5/4/18





 07:00 15:00 23:00 07:00 15:00 23:00


 


Intake Total 1436 ml 1106 ml 100 ml   


 


Balance 1436 ml 1106 ml 100 ml   


 


      


 


Intake Oral  360 ml    


 


IV Total 1436 ml 746 ml 100 ml   


 


# Voids  3    


 


# Bowel Movements  4    








Result Diagram:  


5/3/18 0657                                                                    

            5/3/18 0657





Objective Remarks


GENERAL: Lying in bed.  alert.Appears comfortable.


SKIN: Warm and dry.


HEAD: Normocephalic.


EYES: No scleral icterus. No injection or drainage. 


NECK: Supple, trachea midline. No JVD.


CARDIOVASCULAR: Regular rate and rhythm without murmurs, gallops, or rubs. 


RESPIRATORY: Breath sounds equal bilaterally. No accessory muscle use.


GASTROINTESTINAL: Abdomen soft, non-tender, nondistended. positive bowel sounds.


MUSCULOSKELETAL: No cyanosis, or edema. 


BACK: Nontender without obvious deformity. No CVA tenderness.








A/P


Assessment and Plan





//Suspected bacterial Pneumonia on admission


Aspiration versus community-acquired


Chest x-ray significant for right-sided pneumonia versus aspiration, personally 

reviewed


Azithromycin, Rocephin, Flagyl cover anaerobes.


Swallow eval past.  Appreciate speech therapy assistance.


= Leukopenia.Continue antibiotics.





//Dysphagia over the past 2 weeks.  


= Follow-up GI recommendations.  Patient did pass speech swallow evaluation.


= GI following.








//Sepsis.


= Leukopenia of 3.8, tachycardia of 95 this morning.


= Suspected aspiration pneumonia.


= Possible UTI.


= Cultures pending.


= Continue broad-spectrum antibiotics including metronidazole for coverage of 

aspiration on


5/2.  Leukopenia resolved.  Still with 10% bands.





//UTI


UA consistent with urinary tract infection


Urine culture pending


Antibiotics as above


= 5/2.  Mixed gram positives.  Continue treating for pneumonia above.





//Constipation.  Resolved after laxatives.





//Non-anion gapMetabolic acidosis.


= With respiratory compensation.  ABG reviewed.  Likely bicarbonate losses from 

diarrhea.  Will start on bicarbonate drip.  Recheck labs tomorrow.


= 5/3.  Improved on bicarbonate drip.  We'll decrease bicarbonate drip.  

Monitor on diet.





//Diarrhea.  Likely combination of laxatives and antibiotics.  Check C. 

difficile.  Discontinue laxatives.





//Hypokalemia.3.4.  Replace.  Monitor.





//hypophosphatemia.  1.6.  Replace.  Monitor.





//hypoglycemia. glucose in the 40s this morning.Will check a cortisol.  

Continue IV D5 fluids


=cortisol within normal limits.  Decrease D5 fluids.  Assess response





// COPD


Duo nebs


Continue home Symbicort


Antibiotics as above





//GERD/Kerr's esophagus


Continue PPI





// History of rectal cancer


Status post chemotherapy and radiation


Follow-up as outpatient with oncologist





// Fecal incontinence


Suspect secondary to postradiation changes


CT of the abdomen/pelvis showed no evidence of abscess or perforation without 

stool buildup, personally reviewed


=





Patient lives at home alone, believes she is no longer safe to care for herself 

without assistance.  Case management consulted to assist with placement.





FEN


Regular diet


Electrolytes: s/p PO potassium, repeat BMP


Lovenox


Discharge Planning


PT recommends rehabilitation.  Hopefully discharge to rehabilitation when 

improved, possibly 2 days.


Discharge Planning


pT recommends rehabilitation.  Hopefully discharge to rehabilitation when 

improved, possibly 1-2 days.











Dannie Shaw MD May 3, 2018 23:53

## 2018-05-04 VITALS
DIASTOLIC BLOOD PRESSURE: 60 MMHG | SYSTOLIC BLOOD PRESSURE: 130 MMHG | TEMPERATURE: 98.5 F | OXYGEN SATURATION: 92 % | HEART RATE: 84 BPM | RESPIRATION RATE: 18 BRPM

## 2018-05-04 VITALS
SYSTOLIC BLOOD PRESSURE: 126 MMHG | HEART RATE: 88 BPM | TEMPERATURE: 98.5 F | OXYGEN SATURATION: 92 % | RESPIRATION RATE: 17 BRPM | DIASTOLIC BLOOD PRESSURE: 58 MMHG

## 2018-05-04 VITALS
RESPIRATION RATE: 18 BRPM | HEART RATE: 85 BPM | TEMPERATURE: 98.4 F | SYSTOLIC BLOOD PRESSURE: 150 MMHG | DIASTOLIC BLOOD PRESSURE: 69 MMHG | OXYGEN SATURATION: 92 %

## 2018-05-04 VITALS
OXYGEN SATURATION: 94 % | TEMPERATURE: 98.4 F | SYSTOLIC BLOOD PRESSURE: 129 MMHG | HEART RATE: 92 BPM | DIASTOLIC BLOOD PRESSURE: 64 MMHG | RESPIRATION RATE: 15 BRPM

## 2018-05-04 VITALS — OXYGEN SATURATION: 94 %

## 2018-05-04 VITALS
DIASTOLIC BLOOD PRESSURE: 62 MMHG | TEMPERATURE: 98 F | HEART RATE: 85 BPM | OXYGEN SATURATION: 94 % | SYSTOLIC BLOOD PRESSURE: 132 MMHG | RESPIRATION RATE: 18 BRPM

## 2018-05-04 LAB
ALBUMIN SERPL-MCNC: 1.7 GM/DL (ref 3.4–5)
BASOPHILS # BLD AUTO: 0 TH/MM3 (ref 0–0.2)
BASOPHILS NFR BLD: 0.6 % (ref 0–2)
BUN SERPL-MCNC: 2 MG/DL (ref 7–18)
CALCIUM SERPL-MCNC: 7.6 MG/DL (ref 8.5–10.1)
CHLORIDE SERPL-SCNC: 103 MEQ/L (ref 98–107)
CREAT SERPL-MCNC: 0.42 MG/DL (ref 0.5–1)
DACRYOCYTES BLD QL SMEAR: (no result)
EOSINOPHIL # BLD: 0.1 TH/MM3 (ref 0–0.4)
EOSINOPHIL NFR BLD: 1.4 % (ref 0–4)
ERYTHROCYTE [DISTWIDTH] IN BLOOD BY AUTOMATED COUNT: 32.8 % (ref 11.6–17.2)
GFR SERPLBLD BASED ON 1.73 SQ M-ARVRAT: 149 ML/MIN (ref 89–?)
GLUCOSE SERPL-MCNC: 79 MG/DL (ref 74–106)
HCO3 BLD-SCNC: 25.5 MEQ/L (ref 21–32)
HCT VFR BLD CALC: 23.2 % (ref 35–46)
HGB BLD-MCNC: 8 GM/DL (ref 11.6–15.3)
LYMPHOCYTES # BLD AUTO: 0.3 TH/MM3 (ref 1–4.8)
LYMPHOCYTES NFR BLD AUTO: 8.5 % (ref 9–44)
MAGNESIUM SERPL-MCNC: 1.7 MG/DL (ref 1.5–2.5)
MCH RBC QN AUTO: 31.9 PG (ref 27–34)
MCHC RBC AUTO-ENTMCNC: 34.4 % (ref 32–36)
MCV RBC AUTO: 92.9 FL (ref 80–100)
MONOCYTE #: 0.4 TH/MM3 (ref 0–0.9)
MONOCYTES NFR BLD: 11 % (ref 0–8)
NEUTROPHILS # BLD AUTO: 3 TH/MM3 (ref 1.8–7.7)
NEUTROPHILS NFR BLD AUTO: 78.5 % (ref 16–70)
OVALOCYTES BLD QL SMEAR: (no result)
PHOSPHATE SERPL-MCNC: 3.2 MG/DL (ref 2.5–4.9)
PLATELET # BLD: 195 TH/MM3 (ref 150–450)
PMV BLD AUTO: 8 FL (ref 7–11)
RBC # BLD AUTO: 2.5 MIL/MM3 (ref 4–5.3)
SODIUM SERPL-SCNC: 137 MEQ/L (ref 136–145)
WBC # BLD AUTO: 3.8 TH/MM3 (ref 4–11)

## 2018-05-04 RX ADMIN — SODIUM CHLORIDE SCH MLS/HR: 900 INJECTION INTRAVENOUS at 20:12

## 2018-05-04 RX ADMIN — SODIUM CHLORIDE SCH MLS/HR: 900 INJECTION, SOLUTION INTRAVENOUS at 00:00

## 2018-05-04 RX ADMIN — FAMOTIDINE SCH MG: 20 TABLET, FILM COATED ORAL at 20:10

## 2018-05-04 RX ADMIN — ONDANSETRON PRN MG: 2 INJECTION, SOLUTION INTRAMUSCULAR; INTRAVENOUS at 17:45

## 2018-05-04 RX ADMIN — SUCRALFATE SCH GM: 1 SUSPENSION ORAL at 17:45

## 2018-05-04 RX ADMIN — Medication SCH ML: at 09:31

## 2018-05-04 RX ADMIN — SUCRALFATE SCH GM: 1 SUSPENSION ORAL at 20:11

## 2018-05-04 RX ADMIN — ONDANSETRON PRN MG: 2 INJECTION, SOLUTION INTRAMUSCULAR; INTRAVENOUS at 11:24

## 2018-05-04 RX ADMIN — IPRATROPIUM BROMIDE AND ALBUTEROL SULFATE SCH AMPULE: .5; 3 SOLUTION RESPIRATORY (INHALATION) at 04:01

## 2018-05-04 RX ADMIN — IPRATROPIUM BROMIDE AND ALBUTEROL SULFATE SCH AMPULE: .5; 3 SOLUTION RESPIRATORY (INHALATION) at 16:21

## 2018-05-04 RX ADMIN — BUDESONIDE AND FORMOTEROL FUMARATE DIHYDRATE SCH PUFF: 160; 4.5 AEROSOL RESPIRATORY (INHALATION) at 20:12

## 2018-05-04 RX ADMIN — ENOXAPARIN SODIUM SCH MG: 40 INJECTION SUBCUTANEOUS at 20:09

## 2018-05-04 RX ADMIN — ONDANSETRON PRN MG: 2 INJECTION, SOLUTION INTRAMUSCULAR; INTRAVENOUS at 05:25

## 2018-05-04 RX ADMIN — BUDESONIDE AND FORMOTEROL FUMARATE DIHYDRATE SCH PUFF: 160; 4.5 AEROSOL RESPIRATORY (INHALATION) at 09:30

## 2018-05-04 RX ADMIN — FAMOTIDINE SCH MG: 20 TABLET, FILM COATED ORAL at 09:31

## 2018-05-04 RX ADMIN — STANDARDIZED SENNA CONCENTRATE AND DOCUSATE SODIUM SCH TAB: 8.6; 5 TABLET, FILM COATED ORAL at 09:00

## 2018-05-04 RX ADMIN — SODIUM CHLORIDE SCH MLS/HR: 900 INJECTION, SOLUTION INTRAVENOUS at 17:46

## 2018-05-04 RX ADMIN — HEPARIN SODIUM SCH MLS/HR: 10000 INJECTION, SOLUTION INTRAVENOUS; SUBCUTANEOUS at 05:26

## 2018-05-04 RX ADMIN — AZITHROMYCIN SCH MG: 250 TABLET, FILM COATED ORAL at 17:45

## 2018-05-04 RX ADMIN — Medication SCH ML: at 20:19

## 2018-05-04 RX ADMIN — SODIUM CHLORIDE SCH MLS/HR: 900 INJECTION, SOLUTION INTRAVENOUS at 05:25

## 2018-05-04 RX ADMIN — MONTELUKAST SODIUM SCH MG: 10 TABLET, COATED ORAL at 20:10

## 2018-05-04 RX ADMIN — SUCRALFATE SCH GM: 1 SUSPENSION ORAL at 09:29

## 2018-05-04 RX ADMIN — SODIUM CHLORIDE SCH MLS/HR: 900 INJECTION, SOLUTION INTRAVENOUS at 11:26

## 2018-05-04 RX ADMIN — PRAVASTATIN SODIUM SCH MG: 40 TABLET ORAL at 09:30

## 2018-05-04 RX ADMIN — PANTOPRAZOLE SCH MG: 40 TABLET, DELAYED RELEASE ORAL at 09:31

## 2018-05-04 RX ADMIN — STANDARDIZED SENNA CONCENTRATE AND DOCUSATE SODIUM SCH TAB: 8.6; 5 TABLET, FILM COATED ORAL at 20:10

## 2018-05-04 RX ADMIN — IPRATROPIUM BROMIDE AND ALBUTEROL SULFATE SCH AMPULE: .5; 3 SOLUTION RESPIRATORY (INHALATION) at 19:54

## 2018-05-04 RX ADMIN — SUCRALFATE SCH GM: 1 SUSPENSION ORAL at 12:17

## 2018-05-04 RX ADMIN — IPRATROPIUM BROMIDE AND ALBUTEROL SULFATE SCH AMPULE: .5; 3 SOLUTION RESPIRATORY (INHALATION) at 10:21

## 2018-05-04 NOTE — HHI.PR
Subjective


Remarks


Patient seen this morning.  Says she is feeling all better.  Reports nausea 

improved.  Denies any chest pain shortness of breath.  Patient states there is 

still uncontrolled loose bowel movements, however GI is told her this is to be 

expected with radiation therapy





Objective





Vital Signs








  Date Time  Temp Pulse Resp B/P (MAP) Pulse Ox O2 Delivery O2 Flow Rate FiO2


 


5/4/18 08:00 98.5 84 18 130/60 (83) 92   


 


5/4/18 04:30 98.5 88 17 126/58 (80) 92   


 


5/3/18 23:55 98.7 89 17 117/57 (77) 93   


 


5/3/18 20:10     93   21


 


5/3/18 19:17 98.6 88 17 120/57 (78) 93   


 


5/3/18 16:00 98.5 87 18 123/59 (80) 92   














I/O      


 


 5/3/18 5/3/18 5/3/18 5/4/18 5/4/18 5/4/18





 07:00 15:00 23:00 07:00 15:00 23:00


 


Intake Total 1436 ml 1106 ml 100 ml 360 ml  


 


Balance 1436 ml 1106 ml 100 ml 360 ml  


 


      


 


Intake Oral  360 ml  360 ml  


 


IV Total 1436 ml 746 ml 100 ml   


 


# Voids  3  6  


 


# Bowel Movements  4 2 4  








Result Diagram:  


5/4/18 0457                                                                    

            5/4/18 0457





Objective Remarks


GENERAL: Lying in bed.  alert.  Appears comfortable.


SKIN: Warm and dry.


HEAD: Normocephalic.


EYES: No scleral icterus. No injection or drainage. 


NECK: Supple, trachea midline. No JVD.


CARDIOVASCULAR: Regular rate and rhythm without murmurs, gallops, or rubs. 


RESPIRATORY: Breath sounds equal bilaterally. No accessory muscle use.


GASTROINTESTINAL: Abdomen soft, non-tender, nondistended. positive bowel 

sounds.  No rebound or guarding


MUSCULOSKELETAL: No cyanosis, or edema. 


BACK: Nontender without obvious deformity. No CVA tenderness.








A/P


Assessment and Plan


//Suspected bacterial Pneumonia on admission


Aspiration versus community-acquired


Chest x-ray significant for right-sided pneumonia versus aspiration, personally 

reviewed


Azithromycin, Rocephin, Flagyl cover anaerobes.


Swallow eval past.  Appreciate speech therapy assistance.


= Continue antibiotics to complete treatment course.





//Dysphagia over the past 2 weeks.  


= Follow-up GI recommendations.  Patient did pass speech swallow evaluation.


= GI following.


= Improved on Carafate and antireflux regimen








//Sepsis.


= Leukopenia of 3.8, tachycardia of 95 this morning.


= Suspected aspiration pneumonia.


= Possible UTI.


= Cultures pending.


= Continue broad-spectrum antibiotics including metronidazole for coverage of 

aspiration on


5/2.  Leukopenia resolved.  Still with 10% bands.


= Improving.  Continue antibiotics to complete treatment course.





//UTI


UA consistent with urinary tract infection


Urine culture pending


Antibiotics as above


= 5/2.  Mixed gram positives.  Continue treating for pneumonia above.





//Constipation.  Resolved after laxatives.





//Non-anion gapMetabolic acidosis.


= With respiratory compensation.  ABG reviewed.  Likely bicarbonate losses from 

diarrhea.  Will start on bicarbonate drip.  Recheck labs tomorrow.


= 5/3.  Improved on bicarbonate drip.  We'll decrease bicarbonate drip.  

Monitor on diet.


= 5/4.  Discontinue bicarb drip.  Assess response tomorrow.





//Diarrhea.  Likely combination of laxatives and antibiotics.  Check C. 

difficile.  Discontinue laxatives.


= Negative C. difficile.





//Hypokalemia.3.4.  Replace.  Monitor.





//hypophosphatemia.  1.6.  Replace.  Monitor.


= Resolved after replacement.





//hypoglycemia. glucose in the 40s this morning.Will check a cortisol.  

Continue IV D5 fluids


=cortisol within normal limits.  Decrease D5 fluids.  Assess response


= Appears improved.  Patient tolerating diet.  Discontinue D5 drip.  Continue 

to monitor glucose.





// COPD


Duo nebs


Continue home Symbicort


Antibiotics as above





//GERD/Kerr's esophagus


Continue PPI





// History of rectal cancer


Status post chemotherapy and radiation


Follow-up as outpatient with oncologist





// Fecal incontinence


Suspect secondary to postradiation changes


CT of the abdomen/pelvis showed no evidence of abscess or perforation without 

stool buildup, personally reviewed


=





Patient lives at home alone, believes she is no longer safe to care for herself 

without assistance.  Case management consulted to assist with placement.





FEN


Regular diet


Electrolytes: s/p PO potassium, repeat BMP


Lovenox


Discharge Planning


PT recommends rehabilitation.  Hopefully discharge to rehabilitation when 

improved, possibly 2 days.


= We will need antibiotics to complete treatment course.


= Will need GI clearance











Dannie Shaw MD May 4, 2018 15:02

## 2018-05-05 VITALS
DIASTOLIC BLOOD PRESSURE: 61 MMHG | SYSTOLIC BLOOD PRESSURE: 132 MMHG | RESPIRATION RATE: 18 BRPM | OXYGEN SATURATION: 91 % | TEMPERATURE: 98 F | HEART RATE: 79 BPM

## 2018-05-05 VITALS
HEART RATE: 88 BPM | OXYGEN SATURATION: 92 % | TEMPERATURE: 98.2 F | RESPIRATION RATE: 18 BRPM | SYSTOLIC BLOOD PRESSURE: 137 MMHG | DIASTOLIC BLOOD PRESSURE: 78 MMHG

## 2018-05-05 VITALS
OXYGEN SATURATION: 94 % | TEMPERATURE: 97.9 F | DIASTOLIC BLOOD PRESSURE: 59 MMHG | SYSTOLIC BLOOD PRESSURE: 114 MMHG | RESPIRATION RATE: 16 BRPM | HEART RATE: 86 BPM

## 2018-05-05 VITALS
DIASTOLIC BLOOD PRESSURE: 63 MMHG | OXYGEN SATURATION: 94 % | TEMPERATURE: 98.3 F | HEART RATE: 79 BPM | SYSTOLIC BLOOD PRESSURE: 131 MMHG | RESPIRATION RATE: 16 BRPM

## 2018-05-05 VITALS — OXYGEN SATURATION: 93 %

## 2018-05-05 RX ADMIN — STANDARDIZED SENNA CONCENTRATE AND DOCUSATE SODIUM SCH TAB: 8.6; 5 TABLET, FILM COATED ORAL at 09:00

## 2018-05-05 RX ADMIN — Medication SCH ML: at 10:01

## 2018-05-05 RX ADMIN — ONDANSETRON PRN MG: 2 INJECTION, SOLUTION INTRAMUSCULAR; INTRAVENOUS at 10:01

## 2018-05-05 RX ADMIN — PRAVASTATIN SODIUM SCH MG: 40 TABLET ORAL at 10:00

## 2018-05-05 RX ADMIN — SODIUM CHLORIDE SCH MLS/HR: 900 INJECTION, SOLUTION INTRAVENOUS at 12:26

## 2018-05-05 RX ADMIN — FAMOTIDINE SCH MG: 20 TABLET, FILM COATED ORAL at 10:00

## 2018-05-05 RX ADMIN — ONDANSETRON PRN MG: 2 INJECTION, SOLUTION INTRAMUSCULAR; INTRAVENOUS at 04:10

## 2018-05-05 RX ADMIN — BUDESONIDE AND FORMOTEROL FUMARATE DIHYDRATE SCH PUFF: 160; 4.5 AEROSOL RESPIRATORY (INHALATION) at 10:02

## 2018-05-05 RX ADMIN — PANTOPRAZOLE SCH MG: 40 TABLET, DELAYED RELEASE ORAL at 10:00

## 2018-05-05 RX ADMIN — SUCRALFATE SCH GM: 1 SUSPENSION ORAL at 08:00

## 2018-05-05 RX ADMIN — SODIUM CHLORIDE SCH MLS/HR: 900 INJECTION, SOLUTION INTRAVENOUS at 01:40

## 2018-05-05 RX ADMIN — SUCRALFATE SCH GM: 1 SUSPENSION ORAL at 12:26

## 2018-05-05 RX ADMIN — SODIUM CHLORIDE SCH MLS/HR: 900 INJECTION, SOLUTION INTRAVENOUS at 06:00

## 2018-05-05 NOTE — HHI.PR
Subjective


Remarks


Patient seen and examined this am, vitals are stable and she is afebrile.  

States she feels 75% like herself.  Denies abdominal pain.  Denies chest pain 

or difficulty breathing.  Feels ready to go to rehab.





Objective





Vital Signs








  Date Time  Temp Pulse Resp B/P (MAP) Pulse Ox O2 Delivery O2 Flow Rate FiO2


 


5/5/18 08:00 98.2 88 18 137/78 (97) 92   


 


5/5/18 04:00 98.0 79 18 132/61 (84) 91   


 


5/5/18 00:00 97.9 86 16 114/59 (77) 94   


 


5/4/18 20:00 98.4 92 15 129/64 (85) 94   


 


5/4/18 19:54     94   


 


5/4/18 16:00 98.4 85 18 150/69 (96) 92   














I/O      


 


 5/4/18 5/4/18 5/4/18 5/5/18 5/5/18 5/5/18





 07:00 15:00 23:00 07:00 15:00 23:00


 


Intake Total 360 ml 100 ml 450 ml 480 ml  


 


Balance 360 ml 100 ml 450 ml 480 ml  


 


      


 


Intake Oral 360 ml  450 ml 480 ml  


 


IV Total  100 ml    


 


# Voids 6  6 5  


 


# Bowel Movements 4  4 1  








Result Diagram:  


5/4/18 0457                                                                    

            5/4/18 0457





Imaging





Last Impressions








Modified Barium Swallow 5/1/18 0000 Signed





Impressions: 





 Service Date/Time:  Tuesday, May 1, 2018 00:00 - CONCLUSION: No episodes of 





 aspiration observed.     Rayo Dominguez MD 


 


Abdomen/Pelvis CT 4/30/18 1544 Signed





Impressions: 





 Service Date/Time:  Monday, April 30, 2018 16:40 - CONCLUSION:  1. Perirectal 





 posttreatment change without evidence for abscess or perforation. 2. Right 

lung 





 base and inferior right middle lobe interstitial and air space consolidation 





 concerning for pneumonia or aspiration. This is a new finding since 1/24/2018 





 exam. 3. Additional ancillary findings, as above.     Alvaro Gage MD 


 


Chest X-Ray 4/30/18 0000 Signed





Impressions: 





 Service Date/Time:  Monday, April 30, 2018 18:19 - CONCLUSION:  1. Right-sided 





 basilar airspace disease. Differential diagnosis includes pneumonia and 





 aspiration.     Tom Hinson MD 








Objective Remarks


GENERAL: Well-appearing, no acute distress


SKIN: Warm and dry.


HEAD: Normocephalic.


EYES: No scleral icterus. No injection or drainage. 


NECK: Supple, trachea midline. No JVD or lymphadenopathy.


CARDIOVASCULAR: Regular rate and rhythm without murmurs, gallops, or rubs. 


RESPIRATORY: Breath sounds equal bilaterally. No accessory muscle use.


GASTROINTESTINAL: Abdomen soft, non-tender, nondistended. 


MUSCULOSKELETAL: No cyanosis, or edema.





A/P


Problem List:  


(1) Pneumonia


ICD Code:  J18.9 - Pneumonia, unspecified organism


Status:  Acute


(2) Abdominal pain


ICD Code:  R10.9 - Abdominal pain


Status:  Acute


(3) Anal cancer


ICD Code:  C21.0 - Malignant neoplasm of anus, unspecified


(4) Constipation, chronic


ICD Code:  K59.00 - Constipation, chronic


Status:  Acute


Assessment and Plan


In summary 72-year-old female patient with a medical history significant rectal 

cancer, GERD and COPD presents to the ED for constipation.  ER chest x-ray was 

significant for pneumonia.  The patient was treated for community-acquired and 

aspiration pneumonia.  She was evaluated by GI.  Patient continued to have some 

diarrhea but it was related to her chemotherapy.





Suspected bacterial Pneumonia on admission


Aspiration versus community-acquired


Chest x-ray significant for right-sided pneumonia versus aspiration, personally 

reviewed


Azithromycin, Rocephin, Flagyl cover anaerobes. (has been on abx since 4/30)--> 

will dc on augmentin


Swallow eval past.  Appreciate speech therapy assistance.


Continue antibiotics to complete treatment course.





Dysphagia over the past 2 weeks.  


Follow-up GI recommendations.  Patient did pass speech swallow evaluation.


Improved on Carafate and antireflux regimen





UTI


UA consistent with urinary tract infection


Urine culture pending


Antibiotics as above


Mixed gram positives.  





Non-anion gap Metabolic acidosis.


was previously on bicarb drip.  Now stable.





COPD


Duo nebs


Continue home Symbicort





GERD/Kerr's esophagus


Continue PPI





History of rectal cancer


Status post chemotherapy and radiation


Follow-up as outpatient with oncologist





Fecal incontinence


Suspect secondary to postradiation changes


CT of the abdomen/pelvis showed no evidence of abscess or perforation without 

stool buildup, personally reviewed





Patient lives at home alone, believes she is no longer safe to care for herself 

without assistance.  Case management consulted to assist with placement.





FEN


Regular diet


Lovenox


Discharge Planning


Patient to be discharged back to her home in [place].





Problem Qualifiers





(1) Pneumonia:  


Qualified Codes:  J69.0 - Pneumonitis due to inhalation of food and vomit








Mabel Ramirez MD May 5, 2018 13:20

## 2018-05-05 NOTE — HHI.DCPOC
Discharge Care Plan


Diagnosis:  


(1) Abdominal pain


(2) Constipation, chronic


(3) Pneumonia


Goals to Promote Your Health


* To prevent worsening of your condition and complications


* To maintain your health at the optimal level


Directions to Meet Your Goals


*** Take your medications as prescribed


*** Follow your dietary instruction


*** Follow activity as directed








*** Keep your appointments as scheduled


*** Take your immunizations and boosters as scheduled


*** If your symptoms worsen call your PCP, if no PCP go to Urgent Care Center 

or Emergency Room***


*** Smoking is Dangerous to Your Health. Avoid second hand smoke***


***Call the 24-hour hour crisis hotline for domestic abuse at 1-943.445.5117***











Mabel Ramirez MD May 5, 2018 13:22

## 2023-08-02 NOTE — HHI.DS
Discharge Summary


Admission Date


Apr 30, 2018 at 19:20


Discharge Date:  May 5, 2018


Admitting Diagnosis





acute pneumonia vs aspiration pneumonia, UTI, weakness





CBC/BMP:  


5/4/18 0457                                                                    

            5/4/18 0457





Significant Findings





Laboratory Tests








Test


  5/4/18


04:57


 


White Blood Count


  3.8 TH/MM3


(4.0-11.0)


 


Red Blood Count


  2.50 MIL/MM3


(4.00-5.30)


 


Hemoglobin


  8.0 GM/DL


(11.6-15.3)


 


Hematocrit


  23.2 %


(35.0-46.0)


 


Red Cell Distribution Width


  32.8 %


(11.6-17.2)


 


Neutrophils (%) (Auto)


  78.5 %


(16.0-70.0)


 


Lymphocytes (%) (Auto)


  8.5 %


(9.0-44.0)


 


Monocytes (%) (Auto)


  11.0 %


(0.0-8.0)


 


Lymphocytes # (Auto)


  0.3 TH/MM3


(1.0-4.8)


 


Tear Drop Cells 1+ (NORMAL) 


 


Ovalocytes 1+ (NORMAL) 


 


Blood Urea Nitrogen 2 MG/DL (7-18) 


 


Creatinine


  0.42 MG/DL


(0.50-1.00)


 


Albumin


  1.7 GM/DL


(3.4-5.0)


 


Calcium Level


  7.6 MG/DL


(8.5-10.1)








Imaging





Last Impressions








Modified Barium Swallow 5/1/18 0000 Signed





Impressions: 





 Service Date/Time:  Tuesday, May 1, 2018 00:00 - CONCLUSION: No episodes of 





 aspiration observed.     Rayo Dominguez MD 


 


Abdomen/Pelvis CT 4/30/18 1544 Signed





Impressions: 





 Service Date/Time:  Monday, April 30, 2018 16:40 - CONCLUSION:  1. Perirectal 





 posttreatment change without evidence for abscess or perforation. 2. Right 

lung 





 base and inferior right middle lobe interstitial and air space consolidation 





 concerning for pneumonia or aspiration. This is a new finding since 1/24/2018 





 exam. 3. Additional ancillary findings, as above.     Alvaro Gage MD 


 


Chest X-Ray 4/30/18 0000 Signed





Impressions: 





 Service Date/Time:  Monday, April 30, 2018 18:19 - CONCLUSION:  1. Right-sided 





 basilar airspace disease. Differential diagnosis includes pneumonia and 





 aspiration.     Tom Hinson MD 








PE at Discharge


GENERAL: Well-appearing, no acute distress


SKIN: Warm and dry.


HEAD: Normocephalic.


EYES: No scleral icterus. No injection or drainage. 


NECK: Supple, trachea midline. No JVD or lymphadenopathy.


CARDIOVASCULAR: Regular rate and rhythm without murmurs, gallops, or rubs. 


RESPIRATORY: Breath sounds equal bilaterally. No accessory muscle use.


GASTROINTESTINAL: Abdomen soft, non-tender, nondistended. 


MUSCULOSKELETAL: No cyanosis, or edema.


Hospital Course


In summary 72-year-old female patient with a medical history significant rectal 

cancer, GERD and COPD presents to the ED for constipation.  ER chest x-ray was 

significant for pneumonia.  The patient was treated for community-acquired and 

aspiration pneumonia.  She was evaluated by GI.  Patient continued to have some 

diarrhea but it was related to her chemotherapy.  Patient clinically improved.  

By 5/5 patient had reached maximum benefit from inpatient hospitalization and 

was discharged to Rehab.


Pt Condition on Discharge:  Stable


Discharge Disposition:  Discharge to SNF


Discharge Instructions


DIET: Follow Instructions for:  Heart Healthy Diet


Activities you can perform:  Weight Bearing as Patricia


Follow up Referrals:  


PCP Follow-up - 1 Week





New Medications:  


Amoxicillin-Clavulanate (Augmentin) 875-125 Mg Tab


1 TAB PO BID for Infection for 2 Days, #4 TAB 0 Refills





 


Continued Medications:  


Albuterol 18 GM Inh (Ventolin Hfa 18 GM Inh) 90 Mcg/Act Aer


2 PUFF INH Q6H PRN for SHORTNESS OF BREATH, #1 INHALER 0 Refills





Albuterol Sulfate (Proair Hfa) 90 Mcg Hfa.aer.ad


2.5 MG INH Q6HR





Budesonide-Formoterol Inh (Symbicort Inh) 160-4.5 Mcg/Act Aero


2 PUFF INH Q12HR, INHALER 0 Refills





Dicyclomine (Dicyclomine) 20 Mg Tab


20 MG PO QID for Bowel Management, #120 TAB 0 Refills





Ipratropium-Albuterol Neb (Duoneb) 0.5-2.5 Mg/3 Ml Neb


1 NEBULE INH Q6HR NEB for Breathing Treatment, #120 NEBULE 0 Refills





Lorazepam (Lorazepam) 0.5 Mg Tab


0.5 MG PO BID PRN for ANXIETY for 30 Days, #60 TAB 0 Refills (This prescription 

has been renewed)





Lubiprostone (Amitiza) 8 Mcg Cap


24 MCG PO BID, CAP





Montelukast (Montelukast) 10 Mg Tab


10 MG PO HS, #30 TAB 0 Refills





Ranitidine (Zantac) 300 Mg Tab


300 MG PO DAILY, TAB 0 Refills





Simvastatin (Simvastatin) 20 Mg Tab


20 MG PO DAILY for Cholesterol Management, #30 TAB 0 Refills





Sucralfate (Sucralfate) 1 Gram Tab


1 GM PO TID for Duodenal ulcer, #90 TAB 0 Refills


on empty stomach


 


Discontinued Medications:  


Pantoprazole (Pantoprazole) 40 Mg Tab


40 MG PO DAILY for Reflux, #30 TAB 0 Refills

















Mabel Ramirez MD May 6, 2018 07:15
symmetrical

## 2024-01-22 NOTE — HHI.PR
Subjective


Remarks


She seen this morning.  Says she has had difficult to swelling over the past 2 

weeks.  Denies any chest pain shortness of breath.  She does report some nausea

, no vomiting.  Very small liquid bowel movements.





Objective





Vital Signs








  Date Time  Temp Pulse Resp B/P (MAP) Pulse Ox O2 Delivery O2 Flow Rate FiO2


 


5/1/18 16:00 98.8 91 17 139/65 (89) 95   


 


5/1/18 15:36     96   21


 


5/1/18 12:00 97.8 83 17 144/65 (91) 95   


 


5/1/18 08:00 98.7 95 17 115/58 (77) 94   


 


5/1/18 03:37     94   21


 


5/1/18 03:05 98.2 92 17 122/58 (79) 93   


 


5/1/18 00:08 98.4 98 17 115/53 (73) 92   


 


4/30/18 22:24     94   21


 


4/30/18 20:29 97.6 94 18 133/58 (83) 97   


 


4/30/18 20:00        


 


4/30/18 19:14  97 18 116/96 (103) 97 Room Air  


 


4/30/18 18:00  94 21 131/60 (83) 95 Room Air  














I/O      


 


 4/30/18 4/30/18 4/30/18 5/1/18 5/1/18 5/1/18





 07:00 15:00 23:00 07:00 15:00 23:00


 


Intake Total   950 ml 1284 ml 1300 ml 100 ml


 


Balance   950 ml 1284 ml 1300 ml 100 ml


 


      


 


Intake Oral    480 ml  


 


IV Total   950 ml 804 ml 1300 ml 100 ml


 


# Voids    2 1 


 


# Bowel Movements    4 1 








Result Diagram:  


5/1/18 1431                                                                    

            5/1/18 0513





Objective Remarks


GENERAL: Lying in bed.  Appears comfortable.


SKIN: Warm and dry.


HEAD: Normocephalic.


EYES: No scleral icterus. No injection or drainage. 


NECK: Supple, trachea midline. No JVD.


CARDIOVASCULAR: Regular rate and rhythm without murmurs, gallops, or rubs. 


RESPIRATORY: Breath sounds equal bilaterally. No accessory muscle use.


GASTROINTESTINAL: Abdomen soft, non-tender, nondistended. 


MUSCULOSKELETAL: No cyanosis, or edema. 


BACK: Nontender without obvious deformity. No CVA tenderness.








A/P


Assessment and Plan


//Pneumonia


Aspiration versus community-acquired


Chest x-ray significant for right-sided pneumonia versus aspiration, personally 

reviewed


Azithromycin, Rocephin, Flagyl


Swallow eval past.  Appreciate speech therapy assistance.





//Dysphagia over the past 2 weeks.  Consult gastroenterology.  Appreciate 

assistance.








//Sepsis.


= Leukopenia of 3.8, tachycardia of 95 this morning.


= Suspected aspiration pneumonia.


= Possible UTI.


= Cultures pending.


= Continue broad-spectrum antibiotics including metronidazole for coverage of 

aspiration on





//UTI


UA consistent with urinary tract infection


Urine culture pending


Antibiotics as above





//Constipation.  GI consulted.  Appreciate assistance.





//Hypokalemia.  Replace.  Monitor.





// COPD


Duo nebs


Continue home Symbicort


Antibiotics as above





//GERD/Kerr's esophagus


Continue PPI





// History of rectal cancer


Status post chemotherapy and radiation


Follow-up as outpatient with oncologist





// Fecal incontinence


Suspect secondary to postradiation changes


CT of the abdomen/pelvis showed no evidence of abscess or perforation without 

stool buildup, personally reviewed





Patient lives at home alone, believes she is no longer safe to care for herself 

without assistance.  Case management consulted to assist with placement.





FEN


Regular diet


Electrolytes: s/p PO potassium, repeat BMP


Dannie Ferrera MD May 1, 2018 17:56 cervical spine/Artificial joint